# Patient Record
Sex: FEMALE | Race: WHITE | NOT HISPANIC OR LATINO | Employment: OTHER | URBAN - METROPOLITAN AREA
[De-identification: names, ages, dates, MRNs, and addresses within clinical notes are randomized per-mention and may not be internally consistent; named-entity substitution may affect disease eponyms.]

---

## 2023-05-10 LAB
ALANINE AMINOTRANSFERASE (SGPT) (U/L) IN SER/PLAS: 28 U/L (ref 7–45)
ALBUMIN (G/DL) IN SER/PLAS: 4 G/DL (ref 3.4–5)
ALKALINE PHOSPHATASE (U/L) IN SER/PLAS: 51 U/L (ref 33–136)
ANION GAP IN SER/PLAS: 12 MMOL/L (ref 10–20)
ASPARTATE AMINOTRANSFERASE (SGOT) (U/L) IN SER/PLAS: 35 U/L (ref 9–39)
BILIRUBIN TOTAL (MG/DL) IN SER/PLAS: 0.9 MG/DL (ref 0–1.2)
CALCIDIOL (25 OH VITAMIN D3) (NG/ML) IN SER/PLAS: 47 NG/ML
CALCIUM (MG/DL) IN SER/PLAS: 9.1 MG/DL (ref 8.6–10.6)
CARBON DIOXIDE, TOTAL (MMOL/L) IN SER/PLAS: 29 MMOL/L (ref 21–32)
CHLORIDE (MMOL/L) IN SER/PLAS: 104 MMOL/L (ref 98–107)
CHOLESTEROL (MG/DL) IN SER/PLAS: 257 MG/DL (ref 0–199)
CHOLESTEROL IN HDL (MG/DL) IN SER/PLAS: 114.4 MG/DL
CHOLESTEROL/HDL RATIO: 2.2
COBALAMIN (VITAMIN B12) (PG/ML) IN SER/PLAS: 588 PG/ML (ref 211–911)
CREATININE (MG/DL) IN SER/PLAS: 0.58 MG/DL (ref 0.5–1.05)
ERYTHROCYTE DISTRIBUTION WIDTH (RATIO) BY AUTOMATED COUNT: 12 % (ref 11.5–14.5)
ERYTHROCYTE MEAN CORPUSCULAR HEMOGLOBIN CONCENTRATION (G/DL) BY AUTOMATED: 32 G/DL (ref 32–36)
ERYTHROCYTE MEAN CORPUSCULAR VOLUME (FL) BY AUTOMATED COUNT: 103 FL (ref 80–100)
ERYTHROCYTES (10*6/UL) IN BLOOD BY AUTOMATED COUNT: 4.17 X10E12/L (ref 4–5.2)
GFR FEMALE: >90 ML/MIN/1.73M2
GLUCOSE (MG/DL) IN SER/PLAS: 89 MG/DL (ref 74–99)
HEMATOCRIT (%) IN BLOOD BY AUTOMATED COUNT: 42.8 % (ref 36–46)
HEMOGLOBIN (G/DL) IN BLOOD: 13.7 G/DL (ref 12–16)
LDL: 132 MG/DL (ref 0–99)
LEUKOCYTES (10*3/UL) IN BLOOD BY AUTOMATED COUNT: 3.3 X10E9/L (ref 4.4–11.3)
NRBC (PER 100 WBCS) BY AUTOMATED COUNT: 0 /100 WBC (ref 0–0)
PLATELETS (10*3/UL) IN BLOOD AUTOMATED COUNT: 201 X10E9/L (ref 150–450)
POTASSIUM (MMOL/L) IN SER/PLAS: 4 MMOL/L (ref 3.5–5.3)
PROTEIN TOTAL: 6.2 G/DL (ref 6.4–8.2)
SODIUM (MMOL/L) IN SER/PLAS: 141 MMOL/L (ref 136–145)
THYROTROPIN (MIU/L) IN SER/PLAS BY DETECTION LIMIT <= 0.05 MIU/L: 4.4 MIU/L (ref 0.44–3.98)
THYROXINE (T4) FREE (NG/DL) IN SER/PLAS: 1.04 NG/DL (ref 0.78–1.48)
TRIGLYCERIDE (MG/DL) IN SER/PLAS: 52 MG/DL (ref 0–149)
UREA NITROGEN (MG/DL) IN SER/PLAS: 15 MG/DL (ref 6–23)
VLDL: 10 MG/DL (ref 0–40)

## 2023-05-12 ENCOUNTER — OFFICE VISIT (OUTPATIENT)
Dept: PRIMARY CARE | Facility: CLINIC | Age: 73
End: 2023-05-12
Payer: MEDICARE

## 2023-05-12 VITALS
HEART RATE: 76 BPM | DIASTOLIC BLOOD PRESSURE: 80 MMHG | SYSTOLIC BLOOD PRESSURE: 161 MMHG | HEIGHT: 63 IN | WEIGHT: 97 LBS | BODY MASS INDEX: 17.19 KG/M2 | OXYGEN SATURATION: 98 %

## 2023-05-12 DIAGNOSIS — Z00.00 ROUTINE GENERAL MEDICAL EXAMINATION AT HEALTH CARE FACILITY: Primary | ICD-10-CM

## 2023-05-12 DIAGNOSIS — Z00.00 MEDICARE ANNUAL WELLNESS VISIT, SUBSEQUENT: ICD-10-CM

## 2023-05-12 DIAGNOSIS — Z12.31 ENCOUNTER FOR SCREENING MAMMOGRAM FOR BREAST CANCER: ICD-10-CM

## 2023-05-12 DIAGNOSIS — Z78.0 ASYMPTOMATIC MENOPAUSAL STATE: ICD-10-CM

## 2023-05-12 PROBLEM — D72.819 LEUKOPENIA: Status: ACTIVE | Noted: 2023-05-12

## 2023-05-12 PROBLEM — H18.529 ABMD (ANTERIOR BASEMENT MEMBRANE DYSTROPHY): Status: ACTIVE | Noted: 2023-05-12

## 2023-05-12 PROBLEM — K86.2 PANCREATIC CYST (HHS-HCC): Status: ACTIVE | Noted: 2023-05-12

## 2023-05-12 PROBLEM — M81.0 OSTEOPOROSIS: Status: ACTIVE | Noted: 2023-05-12

## 2023-05-12 PROCEDURE — 1160F RVW MEDS BY RX/DR IN RCRD: CPT | Performed by: FAMILY MEDICINE

## 2023-05-12 PROCEDURE — 1036F TOBACCO NON-USER: CPT | Performed by: FAMILY MEDICINE

## 2023-05-12 PROCEDURE — 1159F MED LIST DOCD IN RCRD: CPT | Performed by: FAMILY MEDICINE

## 2023-05-12 PROCEDURE — 99397 PER PM REEVAL EST PAT 65+ YR: CPT | Performed by: FAMILY MEDICINE

## 2023-05-12 PROCEDURE — 3008F BODY MASS INDEX DOCD: CPT | Performed by: FAMILY MEDICINE

## 2023-05-12 PROCEDURE — G0439 PPPS, SUBSEQ VISIT: HCPCS | Performed by: FAMILY MEDICINE

## 2023-05-12 PROCEDURE — 99497 ADVNCD CARE PLAN 30 MIN: CPT | Performed by: FAMILY MEDICINE

## 2023-05-12 PROCEDURE — 1170F FXNL STATUS ASSESSED: CPT | Performed by: FAMILY MEDICINE

## 2023-05-12 RX ORDER — UBIDECARENONE 75 MG
CAPSULE ORAL
COMMUNITY

## 2023-05-12 RX ORDER — BIOTIN 1 MG
1 TABLET ORAL DAILY
COMMUNITY

## 2023-05-12 RX ORDER — CALCIUM CARBONATE/VITAMIN D3 500 MG-10
TABLET,CHEWABLE ORAL 2 TIMES DAILY
COMMUNITY

## 2023-05-12 RX ORDER — CHOLECALCIFEROL (VITAMIN D3) 25 MCG
1 TABLET ORAL DAILY
COMMUNITY

## 2023-05-12 ASSESSMENT — ENCOUNTER SYMPTOMS
DEPRESSION: 0
OCCASIONAL FEELINGS OF UNSTEADINESS: 0
LOSS OF SENSATION IN FEET: 0

## 2023-05-12 ASSESSMENT — PATIENT HEALTH QUESTIONNAIRE - PHQ9
1. LITTLE INTEREST OR PLEASURE IN DOING THINGS: NOT AT ALL
SUM OF ALL RESPONSES TO PHQ9 QUESTIONS 1 AND 2: 0
2. FEELING DOWN, DEPRESSED OR HOPELESS: NOT AT ALL

## 2023-05-12 ASSESSMENT — ACTIVITIES OF DAILY LIVING (ADL)
DOING_HOUSEWORK: INDEPENDENT
GROCERY_SHOPPING: INDEPENDENT
TAKING_MEDICATION: INDEPENDENT
DRESSING: INDEPENDENT
MANAGING_FINANCES: INDEPENDENT
BATHING: INDEPENDENT

## 2023-05-12 NOTE — PROGRESS NOTES
"Subjective   Patient ID: Claudia Patten is a 73 y.o. female who presents for an Annual Medicare Wellness visit.      HPI   The patient states that she is taking Vitamin D and Calcium supplements for her osteoporosis as prescribed. She reports that she went to get dental work done and was she was told by her dentist that She needs to not be on any osteoporosis medications as they won't let her bones heal. I spoke to the dentist but didn't get convinced to stop her Medications. Her cholesterol is stable without any medications at this time. The patient complains of weight loss and thinks that this is stress related.    Review of Systems  Constitutional: No fever or chills, No Night Sweats  Eyes: No Blurry Vision or Eye sight problems  ENT: No Nasal Discharge, Hoarseness, sore throat  Cardiovascular: no chest pain, no palpitations and no syncope.   Respiratory: no cough, no shortness of breath during exertion and no shortness of breath at rest.   Gastrointestinal: no abdominal pain, no nausea and no vomiting.   : No vaginal discharge, burning with urination, no blood in urine or stools  Skin: No Skin rashes or Lesions  Neuro: No Headache, no dizziness or Numbness or tingling  Psych: No Anxiety, depression or sleeping problems  Heme: No Easy bleeding or brusing.     Objective   /80   Pulse 76   Ht 1.6 m (5' 3\")   Wt (!) 44 kg (97 lb)   SpO2 98%   BMI 17.18 kg/m²     Physical Exam  Patient declined Chaperone  Constitutional: Alert and in no acute distress. Well developed, well nourished.   Head and Face: Head and face: Normal.    Eyes: Normal external exam. Pupils were equal in size, round, reactive to light (PERRL) with normal accommodation and extraocular movements intact (EOMI).   Ears, Nose, Mouth, and Throat: External inspection of ears and nose: Normal.  Hearing: Normal.  Nasal mucosa, septum, and turbinates: Normal.  Lips, teeth, and gums: Normal.  Oropharynx: Normal.   Neck: No neck mass was " observed. Supple. Thyroid not enlarged and there were no palpable thyroid nodules.   Cardiovascular: Heart rate and rhythm were normal, normal S1 and S2. Pedal pulses: Normal. No peripheral edema.   Pulmonary: No respiratory distress. Clear bilateral breath sounds.   Breast: Normal Appearance, No Masses or lumps palpated  Abdomen: Soft nontender; no abdominal mass palpated. Normal bowel sounds. No organomegaly.   Musculoskeletal: No joint swelling seen, normal movements of all extremities. Range of motion: Normal.  Muscle strength/tone: Normal.    Skin: Normal skin color and pigmentation, normal skin turgor, and no rash.   Neurologic: Deep tendon reflexes were 2+ and symmetric.   Psychiatric: Judgment and insight: Intact. Mood and affect: Normal.  Lymphatic: No cervical lymphadenopathy. Palpation of lymph nodes in axillae: Normal.  Palpation of lymph nodes in groin: Normal.    Lab Results   Component Value Date    WBC 3.3 (L) 05/10/2023    HGB 13.7 05/10/2023    HCT 42.8 05/10/2023     05/10/2023    CHOL 257 (H) 05/10/2023    TRIG 52 05/10/2023    .4 05/10/2023    ALT 28 05/10/2023    AST 35 05/10/2023     05/10/2023    K 4.0 05/10/2023     05/10/2023    CREATININE 0.58 05/10/2023    BUN 15 05/10/2023    CO2 29 05/10/2023    TSH 4.40 (H) 05/10/2023       CT chest wo IV contrast  Narrative: Interpreted By:  FRANK PERRY MD  MRN: [id number]  Patient Name: KAREN NELSON     STUDY:  CT CHEST WO CONTRAST;  5/9/2023 10:53 am     INDICATION:  1 year follow up  R91.1: Lung nodule < 6cm on CT.     COMPARISON:  04/26/2022     ACCESSION NUMBER(S):  [id number]     ORDERING CLINICIAN:  PRUDENCIO FRYE     TECHNIQUE:  Helical data acquisition of the chest was obtained  without IV  contrast material.  Images were reformatted in axial, coronal, and  sagittal planes.     FINDINGS:  LUNGS and AIRWAYS:  A 5 mm perifissural node of the right major fissure is stable on  image 173 of series 3. There is also been  stability of a 6 mm  perifissural node on image 147. has been interval stability of  additional 2-5 mm nodules, with index nodules on the right including  image numbers 167, 173 and 175, and on the left image numbers 75 and  87. Given the time interval these would be considered benign.  An additional 3 mm right lung nodule seen on image 191 probably  peripheral mucous plug     MEDIASTINUM and FLOWER, LOWER NECK AND AXILLA:  The visualized thyroid gland is within normal limits.     No evidence of thoracic lymphadenopathy by CT criteria.     HEART and VESSELS:  The thoracic aorta is of normal course and caliber without  significant atherosclerotic calcification .     Main pulmonary artery and its branches are normal in caliber.     There is focal coronary artery atherosclerotic calcification at what  appears to be the main vessel.     The cardiac chambers are not enlarged.     UPPER ABDOMEN:  Nonobstructing calculi at the left upper renal pole measuring up to 7  mm, similar to the prior exam. Additional punctate calcification at  the right upper pole.     CHEST WALL, OSSEOUS STRUCTURES AND OTHER FINDINGS:  There are no suspicious osseous lesions.     Impression: 1.  Stability of nodules as described.  2. Additional findings as above.      Assessment/Plan   Diagnoses and all orders for this visit:  Routine general medical examination at health care facility Medicare annual wellness visit, subsequent  -     DNR Comfort Measures Only  BMI less than 19,adult  -     Referral to Nutrition Services; Future  Asymptomatic menopausal state  -     XR DEXA bone density; Future  Encounter for screening mammogram for breast cancer  -     BI mammo bilateral screening tomosynthesis; Future        Dear Claudia Patten     It was my pleasure to take care of you today in the office. Below are the things we discussed today:    1. 1. Immunizations: Yearly Flu shot is recommended. Up-to-date         a: COVID: Up-to-Date         b:  Tetanus: Up-to-date         c: Shingrix: Up-to-date         d: Pneumovax: Up-to-date         e: Prevnar: Up-to-date    2. Blood Work: Reviewed   3. Seen your dentist twice a year  4. Yearly Eye exam is recommended    5. BMI: Underweight   6: Diet recommendations:   Eat Clean, Try to have as many home cooked meals as possible  Avoid processed foods which contain excess calories, sugar, and sodium.    7. Exercise recommendations:   150 minutes a week to maintain your weight     If you have to loose weight, you need a better diet and exercise plan.     8. Supplements recommended:  a - Calcium 600 mg up to twice a day to get a total of 1200 mg. Each 8 oz of milk or yogurt or 1 oz of cheese, 1 Banana, 1 serving of green Leafy vegetable has about 300 mg of Calcium, so you may subtract that amount. Calcium citrate is the only acceptable supplement to take if you take an acid suppressing medication like Prilosec; otherwise Calcium carbonate is acceptable too (It can cause Constipation).   b - Vitamin D - 2000 IU daily     9. Please get your Living will / Advance directive completed if you do not have one already. Please make sure our office has a copy of the latest one.     10. Colonoscopy: Uptodate, repeat in Nov 2024   11. Mammogram: Uptodate, ordered.  12. PAP: Not indicated   13. DEXA: Ordered for December 2023  14: Skin Check: Please see Dermatology once a year for a Skin Check.     15. Weight loss: Advised the patient that if she is not getting enough meals drink Boost or Ensure. Nutritionist referral.    16. Dental work: Advised the patient that I will reach out to her dentist about dental work and Prolia.    17. Osteoporosis: Continue calcium, vitamin D supplements and strength training exercises. Will talk to dentist to consider starting something.     Follow up in one year for a Physical. Please call the office before your Physical to see if you need blood work completed prior to your physical.     Please call me  if any questions arise from now until your next visit. I will call you after I am done seeing patients. A Doctor is always available by phone when the office is closed. Please feel free to call for help with any problem that you feel shouldn't wait until the office re-opens.     Scribe Attestation  By signing my name below, IJessi, Ines   attest that this documentation has been prepared under the direction and in the presence of Chika Nicolas MD.

## 2023-06-28 ENCOUNTER — TELEPHONE (OUTPATIENT)
Dept: CARE COORDINATION | Facility: CLINIC | Age: 73
End: 2023-06-28
Payer: MEDICARE

## 2023-06-28 NOTE — PROGRESS NOTES
Patient Experience from recent annual visit with Dr. Nicolas in May.    Q 1. Easy get appt? ANSWER:   Q 2. Wait long to get appt? ANSWER:   Q 3. Referrals Easy? ANSWER:   Q 4. Dr know result of referral? ANSWER:   Q 5. Review Rxs? ANSWER:   Q 6. Dr review labs, xrays, etc.?  ANSWER:   Q 7. Insurance cover Rx? ANSWER:   Q 8. Dr ask about Balance? ANSWER:   Q 9.  Ask about bladder control? ANSWER:   Q 10.  Ask about exercise level? ANSWER:     Other Comments:

## 2023-07-10 ENCOUNTER — TELEPHONE (OUTPATIENT)
Dept: PRIMARY CARE | Facility: CLINIC | Age: 73
End: 2023-07-10
Payer: MEDICARE

## 2023-09-15 ENCOUNTER — TELEPHONE (OUTPATIENT)
Dept: PRIMARY CARE | Facility: CLINIC | Age: 73
End: 2023-09-15
Payer: MEDICARE

## 2023-10-11 PROBLEM — E78.5 DYSLIPIDEMIA: Status: ACTIVE | Noted: 2023-10-11

## 2023-10-11 PROBLEM — L57.9 SKIN CHANGES DUE TO CHRONIC EXPOSURE TO NONIONIZING RADIATION, UNSPECIFIED: Status: ACTIVE | Noted: 2017-12-11

## 2023-10-11 PROBLEM — D18.01 HEMANGIOMA OF SKIN AND SUBCUTANEOUS TISSUE: Status: ACTIVE | Noted: 2017-12-11

## 2023-10-11 PROBLEM — L82.1 OTHER SEBORRHEIC KERATOSIS: Status: ACTIVE | Noted: 2017-12-11

## 2023-10-11 PROBLEM — H52.4 MYOPIA WITH ASTIGMATISM AND PRESBYOPIA: Status: ACTIVE | Noted: 2023-10-11

## 2023-10-11 PROBLEM — H02.88B MEIBOMIAN GLAND DYSFUNCTION (MGD) OF UPPER AND LOWER EYELID OF LEFT EYE: Status: ACTIVE | Noted: 2023-10-11

## 2023-10-11 PROBLEM — H04.123 BILATERAL DRY EYES: Status: ACTIVE | Noted: 2023-10-11

## 2023-10-11 PROBLEM — H02.88A MEIBOMIAN GLAND DYSFUNCTION (MGD) OF UPPER AND LOWER EYELID OF RIGHT EYE: Status: ACTIVE | Noted: 2023-10-11

## 2023-10-11 PROBLEM — H69.92 DYSFUNCTION OF LEFT EUSTACHIAN TUBE: Status: ACTIVE | Noted: 2023-10-11

## 2023-10-11 PROBLEM — H52.10 MYOPIA WITH ASTIGMATISM AND PRESBYOPIA: Status: ACTIVE | Noted: 2023-10-11

## 2023-10-11 PROBLEM — H35.372 EPIRETINAL MEMBRANE (ERM) OF LEFT EYE: Status: ACTIVE | Noted: 2023-10-11

## 2023-10-11 PROBLEM — H52.209 MYOPIA WITH ASTIGMATISM AND PRESBYOPIA: Status: ACTIVE | Noted: 2023-10-11

## 2023-10-11 PROBLEM — H90.3 SENSORINEURAL HEARING LOSS, BILATERAL: Status: ACTIVE | Noted: 2023-10-11

## 2023-10-11 PROBLEM — H25.813 COMBINED FORM OF AGE-RELATED CATARACT, BOTH EYES: Status: ACTIVE | Noted: 2023-10-11

## 2023-10-11 PROBLEM — H40.059 ELEVATED IOP: Status: ACTIVE | Noted: 2023-10-11

## 2023-10-11 PROBLEM — H25.013 CORTICAL SENILE CATARACT OF BOTH EYES: Status: ACTIVE | Noted: 2023-10-11

## 2023-10-11 PROBLEM — Z96.1 PSEUDOPHAKIA OF LEFT EYE: Status: ACTIVE | Noted: 2023-10-11

## 2023-10-11 PROBLEM — K83.8 DILATION OF BILIARY TRACT: Status: ACTIVE | Noted: 2023-10-11

## 2023-10-11 PROBLEM — E78.5 HYPERLIPEMIA: Status: ACTIVE | Noted: 2023-10-11

## 2023-10-11 PROBLEM — H61.23 IMPACTED CERUMEN OF BOTH EARS: Status: ACTIVE | Noted: 2023-10-11

## 2023-10-11 RX ORDER — FAMOTIDINE 20 MG/1
20 TABLET, FILM COATED ORAL
COMMUNITY
Start: 2023-09-13

## 2023-10-13 ENCOUNTER — OFFICE VISIT (OUTPATIENT)
Dept: DERMATOLOGY | Facility: CLINIC | Age: 73
End: 2023-10-13
Payer: MEDICARE

## 2023-10-13 DIAGNOSIS — L72.0 EPIDERMOID CYST: Primary | ICD-10-CM

## 2023-10-13 DIAGNOSIS — L57.8 DIFFUSE PHOTODAMAGE OF SKIN: ICD-10-CM

## 2023-10-13 DIAGNOSIS — L21.9 SEBORRHEIC DERMATITIS: ICD-10-CM

## 2023-10-13 DIAGNOSIS — D18.00 HEMANGIOMA, UNSPECIFIED SITE: ICD-10-CM

## 2023-10-13 DIAGNOSIS — D22.60 MELANOCYTIC NEVUS OF UPPER EXTREMITY, UNSPECIFIED LATERALITY: ICD-10-CM

## 2023-10-13 PROCEDURE — 1036F TOBACCO NON-USER: CPT | Performed by: DERMATOLOGY

## 2023-10-13 PROCEDURE — 1126F AMNT PAIN NOTED NONE PRSNT: CPT | Performed by: DERMATOLOGY

## 2023-10-13 PROCEDURE — 1159F MED LIST DOCD IN RCRD: CPT | Performed by: DERMATOLOGY

## 2023-10-13 PROCEDURE — 99204 OFFICE O/P NEW MOD 45 MIN: CPT | Performed by: DERMATOLOGY

## 2023-10-13 PROCEDURE — 1160F RVW MEDS BY RX/DR IN RCRD: CPT | Performed by: DERMATOLOGY

## 2023-10-13 PROCEDURE — 3008F BODY MASS INDEX DOCD: CPT | Performed by: DERMATOLOGY

## 2023-10-13 RX ORDER — KETOCONAZOLE 20 MG/ML
SHAMPOO, SUSPENSION TOPICAL 3 TIMES WEEKLY
Qty: 120 ML | Refills: 11 | Status: CANCELLED | OUTPATIENT
Start: 2023-10-13

## 2023-10-13 NOTE — PROGRESS NOTES
Subjective     Claudia Patten is a 73 y.o. female who presents for the following: Suspicious Skin Lesion (Left upper chest).  She states the lesion has been present for several weeks and has not changed in any way since it appeared, including in size, shape, or color, and is asymptomatic with no associated bleeding, itching, or pain.    Review of Systems:  No other skin or systemic complaints other than what is documented elsewhere in the note.    The following portions of the chart were reviewed this encounter and updated as appropriate:       Skin Cancer History  No skin cancer on file.    Specialty Problems          Dermatology Problems    Hemangioma of skin and subcutaneous tissue    Other seborrheic keratosis    Skin changes due to chronic exposure to nonionizing radiation, unspecified       Past Dermatologic / Past Relevant Medical History:    No history of atypical nevi or skin cancer    Family History:    No family history of melanoma or skin cancer    Social History:    The patient is retired from working in banking    Allergies:  Patient has no known allergies.    Current Medications / CAM's:    Current Outpatient Medications:     ascorbic acid, vitamin C, 100 mg chewable tablet, Chew., Disp: , Rfl:     biotin 1 mg tablet, Take 1 tablet (1 mg) by mouth once daily., Disp: , Rfl:     calcium carbonate-vitamin D3 500 mg-10 mcg (400 unit) chewable tablet, Chew twice a day., Disp: , Rfl:     cholecalciferol (Vitamin D-3) 25 MCG (1000 UT) tablet, Take 1 tablet (25 mcg) by mouth once daily., Disp: , Rfl:     cyanocobalamin (Vitamin B-12) 500 mcg tablet, Take by mouth., Disp: , Rfl:     famotidine (Pepcid) 20 mg tablet, Take 1 tablet (20 mg) by mouth., Disp: , Rfl:      Objective   Well appearing patient in no apparent distress; mood and affect are within normal limits.    A full examination was performed including scalp, face, eyes, ears, nose, lips, neck, chest, axillae, abdomen, back, bilateral upper  extremities, and bilateral lower extremities. All findings within normal limits unless otherwise noted below.    Assessment/Plan   1. Epidermoid cyst (2)  Left Breast, Right Lower Back  On her left upper chest, there is a 6 mm round, flesh-colored, mobile, non-tender, subcutaneous, cystic-appearing nodule with a dilated central punctum    Epidermoid Cyst - left upper chest.  The benign nature of this cystic lesion was discussed with the patient today and reassurance provided.  No treatment is medically indicated for this lesion at this time.  We discussed the possibility of undergoing surgical excision of the cyst for definitive removal, but after discussing the risks and benefits of the procedure, the patient expressed understanding and states she is not interested in undergoing cyst excision at this time.    2. Seborrheic dermatitis  Scalp  On the patient's scalp, there are pink, scaly patches with whitish-yellowish, greasy scale    Seborrheic Dermatitis - scalp.  The potentially chronic and intermittently flaring nature of this condition and treatment options were discussed extensively with the patient today.  At this time, we recommend topical anti-fungal therapy with Ketoconazole 2% shampoo, which the patient was instructed to use 2-3 days per week, alternating with over-the-counter anti-dandruff shampoos, such as Head & Shoulders, Selsun Blue, and Neutrogena T-gel, every month.  The risks, benefits, and side effects of this medication were discussed.  The patient expressed understanding and is in agreement with this plan.    3. Melanocytic nevus of upper extremity, unspecified laterality  Scattered on the patient's face, neck, trunk, and extremities, there are very few small, round- to oval-shaped, brown-pigmented and pink-colored, symmetric, uniform-appearing macules and dome-shaped papules    Clinically benign- to slightly atypical-appearing nevi - the clinically benign- to slightly atypical-appearing nature  of the patient's nevi was discussed with the patient today.  None of the patient's nevi meet threshold for biopsy today.  We emphasized the importance of performing monthly self-skin exams using the ABCDs of monitoring moles, which were reviewed with the patient today and an informational hand-out provided.  We also emphasized the importance of sun avoidance and sun protection with daily sunscreen use.  The patient expressed understanding and is in agreement with this plan.    4. Hemangioma, unspecified site  Scattered on the patient's face, neck, trunk, and extremities, there are multiple small, round, cherry red- to purplish-colored, symmetric, uniform, vascular-appearing macules and papules    Cherry Angiomas - the benign nature of these vascular lesions was discussed with the patient today and reassurance provided.  No treatment is medically indicated for these lesions at this time.    5. Diffuse photodamage of skin  Diffuse photodamage with actinic changes with telangiectasia and mottled pigmentation in sun-exposed areas.    Photodamage.  The signs and symptoms of skin cancer were reviewed and the patient was advised to practice sun protection and sun avoidance, use daily sunscreen, and perform regular self skin exams.  Sun protection was discussed, including avoiding the mid-day sun, wearing a sunscreen with SPF at least 50, and stressing the need for reapplication of sunscreen and applying more than they think they need.

## 2023-11-02 ENCOUNTER — OFFICE VISIT (OUTPATIENT)
Dept: OPHTHALMOLOGY | Facility: CLINIC | Age: 73
End: 2023-11-02
Payer: MEDICARE

## 2023-11-02 DIAGNOSIS — H35.352 CYSTOID MACULAR EDEMA OF LEFT EYE: Primary | ICD-10-CM

## 2023-11-02 DIAGNOSIS — H01.006 BLEPHARITIS OF BOTH EYES, UNSPECIFIED EYELID, UNSPECIFIED TYPE: ICD-10-CM

## 2023-11-02 DIAGNOSIS — Z96.1 PSEUDOPHAKIA OF BOTH EYES: ICD-10-CM

## 2023-11-02 DIAGNOSIS — H01.003 BLEPHARITIS OF BOTH EYES, UNSPECIFIED EYELID, UNSPECIFIED TYPE: ICD-10-CM

## 2023-11-02 PROCEDURE — TAXCU SALES TAX CUYAHOGA COUNTY: Performed by: OPHTHALMOLOGY

## 2023-11-02 PROCEDURE — 92134 CPTRZ OPH DX IMG PST SGM RTA: CPT | Mod: BILATERAL PROCEDURE | Performed by: OPHTHALMOLOGY

## 2023-11-02 PROCEDURE — 92014 COMPRE OPH EXAM EST PT 1/>: CPT | Performed by: OPHTHALMOLOGY

## 2023-11-02 PROCEDURE — 99070(U11): Performed by: OPHTHALMOLOGY

## 2023-11-02 ASSESSMENT — CONF VISUAL FIELD
OS_INFERIOR_NASAL_RESTRICTION: 0
OS_SUPERIOR_TEMPORAL_RESTRICTION: 0
OD_SUPERIOR_NASAL_RESTRICTION: 0
OD_SUPERIOR_TEMPORAL_RESTRICTION: 0
OD_INFERIOR_TEMPORAL_RESTRICTION: 0
OS_INFERIOR_TEMPORAL_RESTRICTION: 0
OD_NORMAL: 1
OD_INFERIOR_NASAL_RESTRICTION: 0
OS_SUPERIOR_NASAL_RESTRICTION: 0
OS_NORMAL: 1

## 2023-11-02 ASSESSMENT — EXTERNAL EXAM - LEFT EYE: OS_EXAM: NORMAL

## 2023-11-02 ASSESSMENT — REFRACTION_MANIFEST
OS_CYLINDER: -1.25
OD_AXIS: 095
OD_CYLINDER: -0.75
OD_SPHERE: +0.25
OS_SPHERE: +0.25
OS_AXIS: 110

## 2023-11-02 ASSESSMENT — ENCOUNTER SYMPTOMS
HEMATOLOGIC/LYMPHATIC NEGATIVE: 0
MUSCULOSKELETAL NEGATIVE: 0
EYES NEGATIVE: 0
GASTROINTESTINAL NEGATIVE: 0
CARDIOVASCULAR NEGATIVE: 0
RESPIRATORY NEGATIVE: 0
NEUROLOGICAL NEGATIVE: 0
CONSTITUTIONAL NEGATIVE: 0
ENDOCRINE NEGATIVE: 0
PSYCHIATRIC NEGATIVE: 0
ALLERGIC/IMMUNOLOGIC NEGATIVE: 0

## 2023-11-02 ASSESSMENT — SLIT LAMP EXAM - LIDS
COMMENTS: NORMAL
COMMENTS: NORMAL

## 2023-11-02 ASSESSMENT — VISUAL ACUITY
CORRECTION_TYPE: GLASSES
OD_CC: 20/20
METHOD: SNELLEN - LINEAR
OS_CC: 20/20

## 2023-11-02 ASSESSMENT — TONOMETRY
IOP_METHOD: GOLDMANN APPLANATION
OD_IOP_MMHG: 17
OS_IOP_MMHG: 17

## 2023-11-02 ASSESSMENT — EXTERNAL EXAM - RIGHT EYE: OD_EXAM: NORMAL

## 2023-11-02 NOTE — PROGRESS NOTES
Assessment/Plan   Diagnoses and all orders for this visit:  Cystoid macular edema of left eye  -     OCT, Retina - OU - Both Eyes  -     OCT, Optic Nerve - OU - Both Eyes  Foveal contour did not return to normal but central thickness is dereaased compared to last test and vision is good  Retinal eval in 6 mo  Pseudophakia of both eyes  stable  Blepharitis of both eyes, unspecified eyelid, unspecified type  Cont lid wipes

## 2023-11-06 ENCOUNTER — OFFICE VISIT (OUTPATIENT)
Dept: OTOLARYNGOLOGY | Facility: CLINIC | Age: 73
End: 2023-11-06
Payer: MEDICARE

## 2023-11-06 VITALS — TEMPERATURE: 96.2 F | BODY MASS INDEX: 16.83 KG/M2 | HEIGHT: 63 IN | WEIGHT: 95 LBS

## 2023-11-06 DIAGNOSIS — H90.3 SENSORINEURAL HEARING LOSS (SNHL) OF BOTH EARS: ICD-10-CM

## 2023-11-06 DIAGNOSIS — H61.23 BILATERAL IMPACTED CERUMEN: Primary | ICD-10-CM

## 2023-11-06 PROCEDURE — 1126F AMNT PAIN NOTED NONE PRSNT: CPT | Performed by: NURSE PRACTITIONER

## 2023-11-06 PROCEDURE — 3008F BODY MASS INDEX DOCD: CPT | Performed by: NURSE PRACTITIONER

## 2023-11-06 PROCEDURE — 1159F MED LIST DOCD IN RCRD: CPT | Performed by: NURSE PRACTITIONER

## 2023-11-06 PROCEDURE — 1036F TOBACCO NON-USER: CPT | Performed by: NURSE PRACTITIONER

## 2023-11-06 PROCEDURE — 69210 REMOVE IMPACTED EAR WAX UNI: CPT | Performed by: NURSE PRACTITIONER

## 2023-11-06 PROCEDURE — 99213 OFFICE O/P EST LOW 20 MIN: CPT | Performed by: NURSE PRACTITIONER

## 2023-11-06 PROCEDURE — 1160F RVW MEDS BY RX/DR IN RCRD: CPT | Performed by: NURSE PRACTITIONER

## 2023-11-06 ASSESSMENT — PATIENT HEALTH QUESTIONNAIRE - PHQ9
SUM OF ALL RESPONSES TO PHQ9 QUESTIONS 1 AND 2: 0
2. FEELING DOWN, DEPRESSED OR HOPELESS: NOT AT ALL
1. LITTLE INTEREST OR PLEASURE IN DOING THINGS: NOT AT ALL

## 2023-11-06 NOTE — PROGRESS NOTES
Subjective   Patient ID: Claudia Patten is a 73 y.o. female who presents for Cerumen Impaction.  HPI  Patient has history of cerumen impactions and bilateral sensorineural hearing loss.  She is currently doing a trial of new hearing aids and feels that she hears better with them than her old pair.  She denies any otalgia, otorrhea.    Review of Systems  A comprehensive or 10 points review of the patient´s constitutional, neurological, HEENT, pulmonary, cardiovascular and genito-urinary systems showed only those mentioned in history of present illness.  Objective   Physical Exam  Constitutional: no fever, chills, weight loss or weight gain   General appearance: Appears well, well-nourished, well groomed. No acute distress.   Communication: Normal communication   Psychiatric: Oriented to person, place and time. Normal mood and affect.   Neurologic: Cranial nerves II-XII grossly intact and symmetric bilaterally.   Head and Face:   Head: Atraumatic with no masses, lesions or scarring.   Face: Normal symmetry, no paralysis, synkinesis or facial tic. No scars or deformities.     Eyes: Conjunctiva not edematous or erythematous   Ears: External inspection of ears with no deformity, scars or masses.  Bilateral canals with cerumen impactions.     Neck: Normal appearing, symmetric, trachea midline.   Cardiovascular: Examination of peripheral vascular system shows no clubbing or cyanosis.   Respiratory: No respiratory distress increased work of breathing. Inspection of the chest with symmetric chest expansion and normal respiratory effort.   Skin: No rashes in the head or neck    Assessment/Plan     This patient presents for subsequent evaluation of acute acquired bilateral cerumen impaction as well as chronic bilateral sensorineural hearing loss.    Reassurance given that otologic exam is normal after cleaning.  She may follow-up as needed.  All questions were answered to patient's satisfaction.  This note was created  using speech recognition transcription software. Despite proofreading, several typographical errors might be present that might affect the meaning of the content. Please call with any questions.    Patient ID: Claudia Patten is a 73 y.o. female.    Ear cerumen removal    Date/Time: 11/6/2023 1:38 PM    Performed by: JOBY López  Authorized by: JOBY López    Consent:     Consent obtained:  Verbal    Consent given by:  Patient    Risks discussed:  Pain    Alternatives discussed:  No treatment  Procedure details:     Location:  L ear and R ear    Procedure type: curette      Procedure outcomes: cerumen removed    Post-procedure details:     Inspection:  No bleeding, ear canal clear and TM intact    Hearing quality:  Normal    Procedure completion:  Tolerated well, no immediate complications

## 2023-12-13 ENCOUNTER — NUTRITION (OUTPATIENT)
Dept: PRIMARY CARE | Facility: CLINIC | Age: 73
End: 2023-12-13
Payer: MEDICARE

## 2023-12-13 VITALS — BODY MASS INDEX: 16.97 KG/M2 | WEIGHT: 95.8 LBS

## 2023-12-13 DIAGNOSIS — Z71.3 ENCOUNTER FOR DIETARY COUNSELING AND SURVEILLANCE: Primary | ICD-10-CM

## 2023-12-13 DIAGNOSIS — M81.0 OSTEOPOROSIS, UNSPECIFIED OSTEOPOROSIS TYPE, UNSPECIFIED PATHOLOGICAL FRACTURE PRESENCE: ICD-10-CM

## 2023-12-13 PROCEDURE — 97802 MEDICAL NUTRITION INDIV IN: CPT

## 2023-12-13 NOTE — PROGRESS NOTES
Nutrition: Initial Assessment    Reason for Nutrition Visit: Patient is a 73 y.o. female referred for BMI less than 19, adult. Referred on 9/15/23 by Dr. Chika Nicolas.     Subjective: Pt presents in office for nutrition counseling. Concerned over lack of wt gain despite trying to increase her kcals. She took care of her aging mother in [place] before she passed in 2018. . Was also traveling back and forth to [facility] to see her sister. Prior to COVID, would go to [facility] and [facility]. Now she does not have as many social events. Reports a high stress level as she is taking care of a couple cats that she adopted. Family members have mentioned their concern over her levels of anxiety and have suggested she get evaluated for OCD.     Food and Nutrition History: Normally eats 2 meals per day but has tried to increase to 3 meals per day.      Allergies: None  Intolerance: None  Appetite: Poor  GI Symptoms : 2-3 looser BM per day  Swallowing Difficulty: No problems with swallowing  Dentition : own  Food Preparation: Patient  Cooking Skills/Barriers: None reported  Grocery Shopping: Patient  Supplements: Vitamin B12, vitamin D3, calcium, MVI   Food Insecurity: Denies    24 Hour Diet Recall  Meal 1: omelet with vegetables, whole grain bread w/ butter, fresh fruit   Meal 2 @ 6:30-7:00 PM: [employer] meatloaf with mashed potatoes, lima beans with white sauce, zucchini  Snack: apple, cookie    Snacks: yogurt, nuts, occ protein drink   Beverages: water, decaf black coffee (no more than 24 oz), herbal tea, occ 100% juice    Physical Activity: Walking 3x per week     Labs  CMP = normal (5/10/23)   Lipid panel: high T-chol = 257, high LDL = 132 (5/10/23) -- could be related to malnutrition   Vitamin D = normal (5/10/23)     Nutrition Focused Physical Exam:  Performed/Deferred: Performed    Muscle Wasting:  Temporal: Moderate  Shoulder: Moderate  Moderate  Interosseous Muscle: None  Quadriceps: Moderate    Loss of  "Subcutaneous Fat:  Eyes: Mild  Perioral: Moderate  Triceps: Mild  Chest: Mild    Other Physical Findings:  Hair: None  None  Mouth: None  Skin: None  Nails: None  none    Malnutrition Present: Moderate Malnutrition    Past Medical History  Patient Active Problem List   Diagnosis    ABMD (anterior basement membrane dystrophy)    Leukopenia    Osteoporosis    Pancreatic cyst    Medicare annual wellness visit, subsequent    BMI less than 19,adult    Asymptomatic menopausal state    Encounter for screening mammogram for breast cancer    Routine general medical examination at health care facility    Bilateral dry eyes    Combined form of age-related cataract, both eyes    Cortical senile cataract of both eyes    Dilation of biliary tract    Dysfunction of left eustachian tube    Dyslipidemia    Elevated IOP    Epiretinal membrane (ERM) of left eye    Hemangioma of skin and subcutaneous tissue    Hyperlipemia    Impacted cerumen of both ears    Meibomian gland dysfunction (MGD) of upper and lower eyelid of left eye    Meibomian gland dysfunction (MGD) of upper and lower eyelid of right eye    Myopia with astigmatism and presbyopia    Other seborrheic keratosis    Pseudophakia of left eye    Sensorineural hearing loss, bilateral    Skin changes due to chronic exposure to nonionizing radiation, unspecified      Anthropometrics  Ht Readings from Last 1 Encounters:   11/06/23 1.6 m (5' 3\")     BMI Readings from Last 1 Encounters:   11/06/23 16.83 kg/m²     Wt Readings from Last 10 Encounters:   11/06/23 (!) 43.1 kg (95 lb)   05/12/23 (!) 44 kg (97 lb)   03/13/23 45.1 kg (99 lb 6.4 oz)   02/28/23 (!) 43.5 kg (96 lb)   12/20/22 44 kg (97 lb)   09/27/22 43.5 kg (96 lb)   09/12/22 42.6 kg (94 lb)   06/09/22 42.8 kg (94 lb 7 oz)   05/11/22 42.2 kg (93 lb)   03/22/22 43.5 kg (96 lb)     Reports her highest adult weight ~118 lbs  UBW = 115 lbs     Estimated Nutrition Needs  Estimated Energy Needs for Weight Maintenance: 1300 " kcals/day  Method for Calculating:  x 1.4    Estimated Energy Needs for Weight Gain: 1550 kcals/day  Method for Calculating: Maintenance + 250    Estimated Protein Needs: 65 grams/day  Method for Calculatin.5 g/kg/d    Nutrition Diagnosis:    Diagnosis Statement 1:  Diagnosis Status: New  Diagnosis : Malnutrition; moderate starvation related related to  inadequate energy intake  as evidenced by  estimated energy intake < 75% estimated energy needs for > 3 months, moderate muscle wasting in temporal and shoulder regions, and mild to moderate loss of fat in the perioral and eye regions.     Nutrition Interventions:  FOOD & NUTRIENT DELIVERY:   Increased energy diet (~1500 kcals per day)  Add protein supplement for an additional 150 kcals/day     COORDINATION OF CARE:   Recommended talking to Dr. Nicolas about high levels of anxiety and your concerns related to possible OCD as this could be impacting your appetite.     NUTRITION EDUCATION:   Discussed seeking out more social events, especially events that involve food ([facility], [facility]).   Discussed adding [employer]life Protein shake for additional calories. Recommend consuming shake between meals and not with meals to maximize intake during meals.   Continue to aim for 3 meals per day.   Use more high calorie additions to meals that do not necessarily add a large volume to the meal, such as peanut butter, olive oil, nuts, etc.   Add some light strength activities 3x per week to increase appetite and preserve bone health and muscle mass.   *Patient expressed understanding of the education provided and denied any additional questions/concerns.     Educational Handouts: [employer] High Calorie Recipe Handouts    Nutrition Goals:  PO intake > 75% estimated energy needs  Gradual weight gain   Initiate exercise regimen     Readiness to Change : Excellent  Level of Understanding : Excellent  Anticipated Compliant : Excellent      Follow-up: 2 months

## 2024-01-03 DIAGNOSIS — E55.9 VITAMIN D DEFICIENCY: ICD-10-CM

## 2024-01-03 DIAGNOSIS — E78.5 DYSLIPIDEMIA: ICD-10-CM

## 2024-01-03 DIAGNOSIS — R73.9 ELEVATED BLOOD SUGAR: ICD-10-CM

## 2024-01-03 DIAGNOSIS — D72.819 LEUKOPENIA, UNSPECIFIED TYPE: Primary | ICD-10-CM

## 2024-01-03 DIAGNOSIS — R94.6 ABNORMAL THYROID EXAM: ICD-10-CM

## 2024-01-29 ENCOUNTER — APPOINTMENT (OUTPATIENT)
Dept: PRIMARY CARE | Facility: CLINIC | Age: 74
End: 2024-01-29
Payer: MEDICARE

## 2024-02-08 ENCOUNTER — OFFICE VISIT (OUTPATIENT)
Dept: OTOLARYNGOLOGY | Facility: CLINIC | Age: 74
End: 2024-02-08
Payer: MEDICARE

## 2024-02-08 VITALS — HEIGHT: 63 IN | TEMPERATURE: 97.3 F | BODY MASS INDEX: 17.5 KG/M2 | WEIGHT: 98.8 LBS

## 2024-02-08 DIAGNOSIS — H90.3 SENSORINEURAL HEARING LOSS (SNHL) OF BOTH EARS: ICD-10-CM

## 2024-02-08 DIAGNOSIS — H61.23 BILATERAL IMPACTED CERUMEN: Primary | ICD-10-CM

## 2024-02-08 PROCEDURE — 1036F TOBACCO NON-USER: CPT | Performed by: NURSE PRACTITIONER

## 2024-02-08 PROCEDURE — 1157F ADVNC CARE PLAN IN RCRD: CPT | Performed by: NURSE PRACTITIONER

## 2024-02-08 PROCEDURE — 3008F BODY MASS INDEX DOCD: CPT | Performed by: NURSE PRACTITIONER

## 2024-02-08 PROCEDURE — 99213 OFFICE O/P EST LOW 20 MIN: CPT | Performed by: NURSE PRACTITIONER

## 2024-02-08 PROCEDURE — 1126F AMNT PAIN NOTED NONE PRSNT: CPT | Performed by: NURSE PRACTITIONER

## 2024-02-08 PROCEDURE — 69210 REMOVE IMPACTED EAR WAX UNI: CPT | Performed by: NURSE PRACTITIONER

## 2024-02-08 PROCEDURE — 1159F MED LIST DOCD IN RCRD: CPT | Performed by: NURSE PRACTITIONER

## 2024-02-08 PROCEDURE — 1160F RVW MEDS BY RX/DR IN RCRD: CPT | Performed by: NURSE PRACTITIONER

## 2024-02-08 ASSESSMENT — PATIENT HEALTH QUESTIONNAIRE - PHQ9
SUM OF ALL RESPONSES TO PHQ9 QUESTIONS 1 AND 2: 0
1. LITTLE INTEREST OR PLEASURE IN DOING THINGS: NOT AT ALL
2. FEELING DOWN, DEPRESSED OR HOPELESS: NOT AT ALL

## 2024-02-08 NOTE — PROGRESS NOTES
Subjective   Patient ID: Claudia Patten is a 73 y.o. female who presents for Cerumen Impaction.  HPI  Patient has history of cerumen impactions and bilateral sensorineural hearing loss.  She wears bilateral hearing aids with good benefit.  She denies any otalgia, otorrhea.    Review of Systems  A comprehensive or 10 points review of the patient´s constitutional, neurological, HEENT, pulmonary, cardiovascular and genito-urinary systems showed only those mentioned in history of present illness.  Objective   Physical Exam  Constitutional: no fever, chills, weight loss or weight gain   General appearance: Appears well, well-nourished, well groomed. No acute distress.   Communication: Normal communication   Psychiatric: Oriented to person, place and time. Normal mood and affect.   Neurologic: Cranial nerves II-XII grossly intact and symmetric bilaterally.   Head and Face:   Head: Atraumatic with no masses, lesions or scarring.   Face: Normal symmetry, no paralysis, synkinesis or facial tic. No scars or deformities.     Eyes: Conjunctiva not edematous or erythematous   Ears: External inspection of ears with no deformity, scars or masses.  Bilateral canals with cerumen impactions.     Neck: Normal appearing, symmetric, trachea midline.   Cardiovascular: Examination of peripheral vascular system shows no clubbing or cyanosis.   Respiratory: No respiratory distress increased work of breathing. Inspection of the chest with symmetric chest expansion and normal respiratory effort.   Skin: No rashes in the head or neck    Assessment/Plan     This patient presents for subsequent evaluation of acute acquired bilateral cerumen impaction as well as chronic bilateral sensorineural hearing loss.    Reassurance given that otologic exam is normal after cleaning.  She may follow-up as needed.  All questions were answered to patient's satisfaction.  This note was created using speech recognition transcription software. Despite  proofreading, several typographical errors might be present that might affect the meaning of the content. Please call with any questions.    Patient ID: Claudia Patten is a 73 y.o. female.    Ear cerumen removal    Date/Time: 2/8/2024 2:54 PM    Performed by: JOBY López  Authorized by: JOBY López    Consent:     Consent obtained:  Verbal    Consent given by:  Patient    Risks discussed:  Pain    Alternatives discussed:  No treatment  Procedure details:     Location:  L ear and R ear    Procedure type: curette      Procedure outcomes: cerumen removed    Post-procedure details:     Inspection:  No bleeding, ear canal clear and TM intact    Hearing quality:  Normal    Procedure completion:  Tolerated well, no immediate complications

## 2024-03-21 ENCOUNTER — TELEPHONE (OUTPATIENT)
Dept: PRIMARY CARE | Facility: CLINIC | Age: 74
End: 2024-03-21
Payer: MEDICARE

## 2024-04-25 ENCOUNTER — LAB (OUTPATIENT)
Dept: LAB | Facility: LAB | Age: 74
End: 2024-04-25
Payer: MEDICARE

## 2024-04-25 DIAGNOSIS — E55.9 VITAMIN D DEFICIENCY: ICD-10-CM

## 2024-04-25 DIAGNOSIS — D72.819 LEUKOPENIA, UNSPECIFIED TYPE: ICD-10-CM

## 2024-04-25 DIAGNOSIS — E78.5 DYSLIPIDEMIA: ICD-10-CM

## 2024-04-25 DIAGNOSIS — R73.9 ELEVATED BLOOD SUGAR: ICD-10-CM

## 2024-04-25 DIAGNOSIS — R94.6 ABNORMAL THYROID EXAM: ICD-10-CM

## 2024-04-25 LAB
25(OH)D3 SERPL-MCNC: 41 NG/ML (ref 30–100)
ALBUMIN SERPL BCP-MCNC: 3.9 G/DL (ref 3.4–5)
ALP SERPL-CCNC: 58 U/L (ref 33–136)
ALT SERPL W P-5'-P-CCNC: 24 U/L (ref 7–45)
ANION GAP SERPL CALC-SCNC: 10 MMOL/L (ref 10–20)
AST SERPL W P-5'-P-CCNC: 33 U/L (ref 9–39)
BILIRUB SERPL-MCNC: 0.8 MG/DL (ref 0–1.2)
BUN SERPL-MCNC: 20 MG/DL (ref 6–23)
CALCIUM SERPL-MCNC: 8.8 MG/DL (ref 8.6–10.6)
CHLORIDE SERPL-SCNC: 102 MMOL/L (ref 98–107)
CHOLEST SERPL-MCNC: 258 MG/DL (ref 0–199)
CHOLESTEROL/HDL RATIO: 2.5
CO2 SERPL-SCNC: 31 MMOL/L (ref 21–32)
CREAT SERPL-MCNC: 0.58 MG/DL (ref 0.5–1.05)
EGFRCR SERPLBLD CKD-EPI 2021: >90 ML/MIN/1.73M*2
ERYTHROCYTE [DISTWIDTH] IN BLOOD BY AUTOMATED COUNT: 12.1 % (ref 11.5–14.5)
EST. AVERAGE GLUCOSE BLD GHB EST-MCNC: 108 MG/DL
GLUCOSE SERPL-MCNC: 77 MG/DL (ref 74–99)
HBA1C MFR BLD: 5.4 %
HCT VFR BLD AUTO: 43 % (ref 36–46)
HDLC SERPL-MCNC: 104 MG/DL
HGB BLD-MCNC: 13.9 G/DL (ref 12–16)
LDLC SERPL CALC-MCNC: 143 MG/DL
MCH RBC QN AUTO: 32.6 PG (ref 26–34)
MCHC RBC AUTO-ENTMCNC: 32.3 G/DL (ref 32–36)
MCV RBC AUTO: 101 FL (ref 80–100)
NON HDL CHOLESTEROL: 154 MG/DL (ref 0–149)
NRBC BLD-RTO: 0 /100 WBCS (ref 0–0)
PLATELET # BLD AUTO: 231 X10*3/UL (ref 150–450)
POTASSIUM SERPL-SCNC: 3.8 MMOL/L (ref 3.5–5.3)
PROT SERPL-MCNC: 6.1 G/DL (ref 6.4–8.2)
RBC # BLD AUTO: 4.26 X10*6/UL (ref 4–5.2)
SODIUM SERPL-SCNC: 139 MMOL/L (ref 136–145)
TRIGL SERPL-MCNC: 57 MG/DL (ref 0–149)
TSH SERPL-ACNC: 2.51 MIU/L (ref 0.44–3.98)
VIT B12 SERPL-MCNC: 401 PG/ML (ref 211–911)
VLDL: 11 MG/DL (ref 0–40)
WBC # BLD AUTO: 3.9 X10*3/UL (ref 4.4–11.3)

## 2024-04-25 PROCEDURE — 84443 ASSAY THYROID STIM HORMONE: CPT

## 2024-04-25 PROCEDURE — 80053 COMPREHEN METABOLIC PANEL: CPT

## 2024-04-25 PROCEDURE — 82607 VITAMIN B-12: CPT

## 2024-04-25 PROCEDURE — 36415 COLL VENOUS BLD VENIPUNCTURE: CPT

## 2024-04-25 PROCEDURE — 80061 LIPID PANEL: CPT

## 2024-04-25 PROCEDURE — 85027 COMPLETE CBC AUTOMATED: CPT

## 2024-04-25 PROCEDURE — 82306 VITAMIN D 25 HYDROXY: CPT

## 2024-04-25 PROCEDURE — 83036 HEMOGLOBIN GLYCOSYLATED A1C: CPT

## 2024-05-09 ENCOUNTER — OFFICE VISIT (OUTPATIENT)
Dept: OTOLARYNGOLOGY | Facility: CLINIC | Age: 74
End: 2024-05-09
Payer: MEDICARE

## 2024-05-09 VITALS — HEIGHT: 63 IN | BODY MASS INDEX: 17.4 KG/M2 | WEIGHT: 98.2 LBS | TEMPERATURE: 96.8 F

## 2024-05-09 DIAGNOSIS — H90.3 SENSORINEURAL HEARING LOSS (SNHL) OF BOTH EARS: ICD-10-CM

## 2024-05-09 DIAGNOSIS — H61.23 BILATERAL IMPACTED CERUMEN: Primary | ICD-10-CM

## 2024-05-09 PROCEDURE — 1036F TOBACCO NON-USER: CPT | Performed by: NURSE PRACTITIONER

## 2024-05-09 PROCEDURE — 1157F ADVNC CARE PLAN IN RCRD: CPT | Performed by: NURSE PRACTITIONER

## 2024-05-09 PROCEDURE — 69210 REMOVE IMPACTED EAR WAX UNI: CPT | Performed by: NURSE PRACTITIONER

## 2024-05-09 PROCEDURE — 1159F MED LIST DOCD IN RCRD: CPT | Performed by: NURSE PRACTITIONER

## 2024-05-09 PROCEDURE — 99213 OFFICE O/P EST LOW 20 MIN: CPT | Performed by: NURSE PRACTITIONER

## 2024-05-09 PROCEDURE — 1160F RVW MEDS BY RX/DR IN RCRD: CPT | Performed by: NURSE PRACTITIONER

## 2024-05-09 ASSESSMENT — PATIENT HEALTH QUESTIONNAIRE - PHQ9
2. FEELING DOWN, DEPRESSED OR HOPELESS: NOT AT ALL
1. LITTLE INTEREST OR PLEASURE IN DOING THINGS: NOT AT ALL
SUM OF ALL RESPONSES TO PHQ9 QUESTIONS 1 AND 2: 0

## 2024-05-09 NOTE — PROGRESS NOTES
Subjective   Patient ID: Claudia Patten is a 74 y.o. female who presents for Cerumen Impaction.  HPI  Patient has history of cerumen impactions and bilateral sensorineural hearing loss.  She wears bilateral hearing aids with good benefit.  She denies any otalgia, otorrhea.    Review of Systems  A comprehensive or 10 points review of the patient´s constitutional, neurological, HEENT, pulmonary, cardiovascular and genito-urinary systems showed only those mentioned in history of present illness.  Objective   Physical Exam  Constitutional: no fever, chills, weight loss or weight gain   General appearance: Appears well, well-nourished, well groomed. No acute distress.   Communication: Normal communication   Psychiatric: Oriented to person, place and time. Normal mood and affect.   Neurologic: Cranial nerves II-XII grossly intact and symmetric bilaterally.   Head and Face:   Head: Atraumatic with no masses, lesions or scarring.   Face: Normal symmetry, no paralysis, synkinesis or facial tic. No scars or deformities.     Eyes: Conjunctiva not edematous or erythematous   Ears: External inspection of ears with no deformity, scars or masses.  Bilateral canals with cerumen impactions.     Neck: Normal appearing, symmetric, trachea midline.   Cardiovascular: Examination of peripheral vascular system shows no clubbing or cyanosis.   Respiratory: No respiratory distress increased work of breathing. Inspection of the chest with symmetric chest expansion and normal respiratory effort.   Skin: No rashes in the head or neck    Assessment/Plan     This patient presents for subsequent evaluation of acute acquired bilateral cerumen impaction as well as chronic bilateral sensorineural hearing loss.    Reassurance given that otologic exam is normal after cleaning.  She may follow-up as needed.  All questions were answered to patient's satisfaction.    This note was created using speech recognition transcription software. Despite  proofreading, several typographical errors might be present that might affect the meaning of the content. Please call with any questions.    Patient ID: Claudia Patten is a 74 y.o. female.    Ear cerumen removal    Date/Time: 5/9/2024 1:41 PM    Performed by: JOBY López  Authorized by: JOBY López    Consent:     Consent obtained:  Verbal    Consent given by:  Patient    Risks discussed:  Pain    Alternatives discussed:  No treatment  Procedure details:     Location:  L ear and R ear    Procedure type: curette      Procedure type comment:  Suction and alligator    Procedure outcomes: cerumen removed    Post-procedure details:     Inspection:  No bleeding, ear canal clear and TM intact    Hearing quality:  Normal    Procedure completion:  Tolerated well, no immediate complications

## 2024-05-13 NOTE — PROGRESS NOTES
"Subjective   Patient ID: Claudia Patten is a 74 y.o. female who presents for Medicare Annual Wellness Visit Subsequent and OCD (Obsessive-Compulsive Disorder) (Concerns).      HPI   The patient reports that she is taking Pepcid for GERD and denies having side effects from it. She states that her family members are concerned about her having OCD however, she does not think this is the case. She thinks that it is her natural personality and that there may be a component of anxiety involved.    Review of Systems  Constitutional: No fever or chills, No Night Sweats  Eyes: No Blurry Vision or Eye sight problems  ENT: No Nasal Discharge, Hoarseness, sore throat  Cardiovascular: no chest pain, no palpitations and no syncope.   Respiratory: no cough, no shortness of breath during exertion and no shortness of breath at rest.   Gastrointestinal: no abdominal pain, no nausea and no vomiting.   : No vaginal discharge, burning with urination, no blood in urine or stools  Skin: No Skin rashes or Lesions  Neuro: No Headache, no dizziness or Numbness or tingling  Psych: No Anxiety, depression or sleeping problems  Heme: No Easy bleeding or brusing.     Objective   /83   Pulse 66   Ht 1.6 m (5' 3\")   Wt (!) 43.5 kg (96 lb)   SpO2 98%   BMI 17.01 kg/m²     Physical Exam  Constitutional: Alert and in no acute distress. Well developed, well nourished.   Head and Face: Head and face: Normal.    Eyes: Normal external exam. Pupils were equal in size, round, reactive to light (PERRL) with normal accommodation and extraocular movements intact (EOMI).   Ears, Nose, Mouth, and Throat: External inspection of ears and nose: Normal.  Hearing: Normal.  Nasal mucosa, septum, and turbinates: Normal.  Lips, teeth, and gums: Normal.  Oropharynx: Normal.   Neck: No neck mass was observed. Supple. Thyroid not enlarged and there were no palpable thyroid nodules.   Cardiovascular: Heart rate and rhythm were normal, normal S1 and S2. " Pedal pulses: Normal. No peripheral edema.   Pulmonary: No respiratory distress. Clear bilateral breath sounds.   Abdomen: Soft nontender; no abdominal mass palpated. Normal bowel sounds. No organomegaly.   Musculoskeletal: No joint swelling seen, normal movements of all extremities. Range of motion: Normal.  Muscle strength/tone: Normal.    Skin: Normal skin color and pigmentation, normal skin turgor, and no rash.   Neurologic: Deep tendon reflexes were 2+ and symmetric.   Psychiatric: Judgment and insight: Intact. Mood and affect: Normal.  Lymphatic: No cervical lymphadenopathy. Palpation of lymph nodes in axillae: Normal.  Palpation of lymph nodes in groin: Normal.    Lab Results   Component Value Date    WBC 3.9 (L) 04/25/2024    HGB 13.9 04/25/2024    HCT 43.0 04/25/2024     04/25/2024    CHOL 258 (H) 04/25/2024    TRIG 57 04/25/2024    .0 04/25/2024    ALT 24 04/25/2024    AST 33 04/25/2024     04/25/2024    K 3.8 04/25/2024     04/25/2024    CREATININE 0.58 04/25/2024    BUN 20 04/25/2024    CO2 31 04/25/2024    TSH 2.51 04/25/2024    HGBA1C 5.4 04/25/2024       OCT, Optic Nerve - OU - Both Eyes  Retinal multimodal imaging including photography was completed, and the   findings are described in the examination.  stable  OCT, Retina - OU - Both Eyes  Right Eye  Findings include normal observations.     Left Eye  Findings include abnormal foveal contour.     Notes  Retinal multimodal imaging including photography was completed, and the   findings are described in the examination.  Central thickness 11/21 445, today 416      Assessment/Plan   Diagnoses and all orders for this visit:  Medicare annual wellness visit, subsequent  Protein-calorie malnutrition, unspecified severity (Multi)  BMI less than 19,adult  Asymptomatic menopausal state  -     XR DEXA bone density; Future  Encounter for screening mammogram for breast cancer  -     BI mammo bilateral screening tomosynthesis;  Future  Encounter for screening for malignant neoplasm of colon  -     Colonoscopy Screening; High Risk Patient; Future  MARKUS (generalized anxiety disorder)  -     FLUoxetine (PROzac) 10 mg capsule; Take 1 capsule (10 mg) by mouth once daily.  -     Follow Up In [facility] - PCP - Established; Future  Moderate mixed hyperlipidemia not requiring statin therapy  -     Lipoprotein a; Future        Dear Claudia Patten     It was my pleasure to take care of you today in the office. Below are the things we discussed today:    1. 1. Immunizations: Yearly Flu shot is recommended. Up-to-date          a: COVID: Please get booster from the pharmacy          b: Tetanus: Up-to-date. Done in 2022          c: Shingrix: Up-to-date         d: Pneumovax: Up-to-date         e: Prevnar: Up-to-date         F. RSV: Please get from the pharmacy in fall     2. Blood Work: Reviewed   3. Seen your dentist twice a year  4. Yearly Eye exam is recommended    5. BMI: Underweight  6: Diet recommendations:   Eat Clean, Try to have as many home cooked meals as possible  Avoid processed foods which contain excess calories, sugar, and sodium.    7. Exercise recommendations:   150 minutes a week to maintain your weight     If you have to loose weight, you need a better diet and exercise plan.     8. Supplements recommended:  a - Calcium 600 mg up to twice a day to get a total of 1200 mg. Each 8 oz of milk or yogurt or 1 oz of cheese, 1 Banana, 1 serving of green Leafy vegetable has about 300 mg of Calcium, so you may subtract that amount. Calcium citrate is the only acceptable supplement to take if you take an acid suppressing medication like Prilosec; otherwise Calcium carbonate is acceptable too (It can cause Constipation).   b - Vitamin D - 2000 IU daily     9. Please get your Living will / Advance directive completed if you do not have one already. Please make sure our office has a copy of the latest one.     10. Colonoscopy:  Uptodate. Last done in Oct 2021, repeat in Oct 2024. Ordered for Oct 2024   11. Mammogram: Uptodate. Ordered for June 2024   12. PAP: Not indicated   13. DEXA: Ordered  14: Skin Check: Please see Dermatology once a year for a Skin Check.     15. GERD: Continue Pepcid.     16. Lung nodule: Stable.    17. Mild anxiety: Start Prozac. Advised her to stop taking the medication and let me know if she notices any significant side effects after taking it.    18. Hyperlipidemia: Lipoprotein A ordered.    19. Neutropenia: Stable.    Follow up in 6 weeks.    Follow up in one year for a Physical. Please call the office before your Physical to see if you need blood work completed prior to your physical.     Please call me if any questions arise from now until your next visit. I will call you after I am done seeing patients. A Doctor is always available by phone when the office is closed. Please feel free to call for help with any problem that you feel shouldn't wait until the office re-opens.     Scribe Attestation  By signing my name below, IJessi Scribe   attest that this documentation has been prepared under the direction and in the presence of Chika Nicolas MD.

## 2024-05-14 ENCOUNTER — LAB (OUTPATIENT)
Dept: LAB | Facility: LAB | Age: 74
End: 2024-05-14
Payer: MEDICARE

## 2024-05-14 ENCOUNTER — OFFICE VISIT (OUTPATIENT)
Dept: PRIMARY CARE | Facility: CLINIC | Age: 74
End: 2024-05-14
Payer: MEDICARE

## 2024-05-14 VITALS
BODY MASS INDEX: 17.01 KG/M2 | DIASTOLIC BLOOD PRESSURE: 83 MMHG | OXYGEN SATURATION: 98 % | HEART RATE: 66 BPM | SYSTOLIC BLOOD PRESSURE: 130 MMHG | HEIGHT: 63 IN | WEIGHT: 96 LBS

## 2024-05-14 DIAGNOSIS — E46 PROTEIN-CALORIE MALNUTRITION, UNSPECIFIED SEVERITY (MULTI): ICD-10-CM

## 2024-05-14 DIAGNOSIS — Z12.11 ENCOUNTER FOR SCREENING FOR MALIGNANT NEOPLASM OF COLON: ICD-10-CM

## 2024-05-14 DIAGNOSIS — F41.1 GAD (GENERALIZED ANXIETY DISORDER): ICD-10-CM

## 2024-05-14 DIAGNOSIS — E78.2 MODERATE MIXED HYPERLIPIDEMIA NOT REQUIRING STATIN THERAPY: ICD-10-CM

## 2024-05-14 DIAGNOSIS — Z12.31 ENCOUNTER FOR SCREENING MAMMOGRAM FOR BREAST CANCER: ICD-10-CM

## 2024-05-14 DIAGNOSIS — Z00.00 MEDICARE ANNUAL WELLNESS VISIT, SUBSEQUENT: Primary | ICD-10-CM

## 2024-05-14 DIAGNOSIS — Z78.0 ASYMPTOMATIC MENOPAUSAL STATE: ICD-10-CM

## 2024-05-14 PROBLEM — E78.5 DYSLIPIDEMIA: Status: RESOLVED | Noted: 2023-10-11 | Resolved: 2024-05-14

## 2024-05-14 PROCEDURE — G0439 PPPS, SUBSEQ VISIT: HCPCS | Performed by: FAMILY MEDICINE

## 2024-05-14 PROCEDURE — 1036F TOBACCO NON-USER: CPT | Performed by: FAMILY MEDICINE

## 2024-05-14 PROCEDURE — 99397 PER PM REEVAL EST PAT 65+ YR: CPT | Performed by: FAMILY MEDICINE

## 2024-05-14 PROCEDURE — 1159F MED LIST DOCD IN RCRD: CPT | Performed by: FAMILY MEDICINE

## 2024-05-14 PROCEDURE — 82172 ASSAY OF APOLIPOPROTEIN: CPT

## 2024-05-14 PROCEDURE — 1160F RVW MEDS BY RX/DR IN RCRD: CPT | Performed by: FAMILY MEDICINE

## 2024-05-14 PROCEDURE — 36415 COLL VENOUS BLD VENIPUNCTURE: CPT

## 2024-05-14 PROCEDURE — 1157F ADVNC CARE PLAN IN RCRD: CPT | Performed by: FAMILY MEDICINE

## 2024-05-14 PROCEDURE — 1123F ACP DISCUSS/DSCN MKR DOCD: CPT | Performed by: FAMILY MEDICINE

## 2024-05-14 PROCEDURE — 99214 OFFICE O/P EST MOD 30 MIN: CPT | Performed by: FAMILY MEDICINE

## 2024-05-14 PROCEDURE — 1170F FXNL STATUS ASSESSED: CPT | Performed by: FAMILY MEDICINE

## 2024-05-14 RX ORDER — FLUOXETINE 10 MG/1
10 CAPSULE ORAL DAILY
Qty: 90 CAPSULE | Refills: 0 | Status: SHIPPED | OUTPATIENT
Start: 2024-05-14

## 2024-05-14 ASSESSMENT — ANXIETY QUESTIONNAIRES
GAD7 TOTAL SCORE: 9
IF YOU CHECKED OFF ANY PROBLEMS ON THIS QUESTIONNAIRE, HOW DIFFICULT HAVE THESE PROBLEMS MADE IT FOR YOU TO DO YOUR WORK, TAKE CARE OF THINGS AT HOME, OR GET ALONG WITH OTHER PEOPLE: SOMEWHAT DIFFICULT
2. NOT BEING ABLE TO STOP OR CONTROL WORRYING: SEVERAL DAYS
7. FEELING AFRAID AS IF SOMETHING AWFUL MIGHT HAPPEN: NOT AT ALL
3. WORRYING TOO MUCH ABOUT DIFFERENT THINGS: MORE THAN HALF THE DAYS
5. BEING SO RESTLESS THAT IT IS HARD TO SIT STILL: NEARLY EVERY DAY
6. BECOMING EASILY ANNOYED OR IRRITABLE: SEVERAL DAYS
4. TROUBLE RELAXING: SEVERAL DAYS
1. FEELING NERVOUS, ANXIOUS, OR ON EDGE: SEVERAL DAYS

## 2024-05-14 ASSESSMENT — ACTIVITIES OF DAILY LIVING (ADL)
TAKING_MEDICATION: INDEPENDENT
BATHING: INDEPENDENT
GROCERY_SHOPPING: INDEPENDENT
MANAGING_FINANCES: INDEPENDENT
DOING_HOUSEWORK: INDEPENDENT
DRESSING: INDEPENDENT

## 2024-05-14 ASSESSMENT — ENCOUNTER SYMPTOMS
OCCASIONAL FEELINGS OF UNSTEADINESS: 0
DEPRESSION: 0
LOSS OF SENSATION IN FEET: 0

## 2024-05-14 ASSESSMENT — COLUMBIA-SUICIDE SEVERITY RATING SCALE - C-SSRS
1. IN THE PAST MONTH, HAVE YOU WISHED YOU WERE DEAD OR WISHED YOU COULD GO TO SLEEP AND NOT WAKE UP?: NO
2. HAVE YOU ACTUALLY HAD ANY THOUGHTS OF KILLING YOURSELF?: NO

## 2024-05-17 LAB — LPA SERPL-MCNC: 9 MG/DL

## 2024-05-22 ENCOUNTER — OFFICE VISIT (OUTPATIENT)
Dept: OPHTHALMOLOGY | Facility: CLINIC | Age: 74
End: 2024-05-22
Payer: MEDICARE

## 2024-05-22 DIAGNOSIS — H35.352 CYSTOID MACULAR EDEMA OF LEFT EYE: Primary | ICD-10-CM

## 2024-05-22 DIAGNOSIS — H35.372 EPIRETINAL MEMBRANE (ERM) OF LEFT EYE: ICD-10-CM

## 2024-05-22 PROCEDURE — 92134 CPTRZ OPH DX IMG PST SGM RTA: CPT

## 2024-05-22 PROCEDURE — 99213 OFFICE O/P EST LOW 20 MIN: CPT

## 2024-05-22 ASSESSMENT — REFRACTION_WEARINGRX
OS_CYLINDER: -0.75
OS_AXIS: 110
OD_ADD: +2.50
OS_ADD: +2.50
OD_SPHERE: +0.50
OS_SPHERE: +0.25
OD_AXIS: 095
OD_CYLINDER: -1.50

## 2024-05-22 ASSESSMENT — CONF VISUAL FIELD
OS_INFERIOR_NASAL_RESTRICTION: 0
OS_SUPERIOR_NASAL_RESTRICTION: 0
OD_SUPERIOR_NASAL_RESTRICTION: 0
OS_SUPERIOR_TEMPORAL_RESTRICTION: 0
OD_INFERIOR_TEMPORAL_RESTRICTION: 0
OD_SUPERIOR_TEMPORAL_RESTRICTION: 0
OS_INFERIOR_TEMPORAL_RESTRICTION: 0
OD_INFERIOR_NASAL_RESTRICTION: 0

## 2024-05-22 ASSESSMENT — ENCOUNTER SYMPTOMS
RESPIRATORY NEGATIVE: 0
CONSTITUTIONAL NEGATIVE: 0
GASTROINTESTINAL NEGATIVE: 0
NEUROLOGICAL NEGATIVE: 0
MUSCULOSKELETAL NEGATIVE: 0
ALLERGIC/IMMUNOLOGIC NEGATIVE: 0
ENDOCRINE NEGATIVE: 0
PSYCHIATRIC NEGATIVE: 0
EYES NEGATIVE: 0
CARDIOVASCULAR NEGATIVE: 0
HEMATOLOGIC/LYMPHATIC NEGATIVE: 0

## 2024-05-22 ASSESSMENT — EXTERNAL EXAM - LEFT EYE: OS_EXAM: NORMAL

## 2024-05-22 ASSESSMENT — EXTERNAL EXAM - RIGHT EYE: OD_EXAM: NORMAL

## 2024-05-22 ASSESSMENT — VISUAL ACUITY
OS_CC: 20/30
CORRECTION_TYPE: GLASSES
OD_CC+: -3
OS_CC+: -1
OD_CC: 20/20
METHOD: SNELLEN - LINEAR

## 2024-05-22 ASSESSMENT — TONOMETRY
OD_IOP_MMHG: 15
OS_IOP_MMHG: 15
IOP_METHOD: GOLDMANN APPLANATION

## 2024-05-22 ASSESSMENT — SLIT LAMP EXAM - LIDS
COMMENTS: NORMAL
COMMENTS: NORMAL

## 2024-05-22 NOTE — PROGRESS NOTES
ERM, left eye  S/P phaco vit ERM peel ([facility])    OCT 05/22/24     - Right eye (OD): spongy edema, DoNFL, intact RPE, outer and inner retinal layers. No IRF or SRF    - Left eye (OS): Normal foveal contour, intact RPE  outer and inner retinal layers. No IRF or SRF      Impression  Increased retinal thickeners s/p ERM peel, left eye. Normal finding after ERM peel  Observe  RTC in 1 year with Dr. Cordero with me PRN     Pseudophakia of both eyes  Blepharitis of both eyes, unspecified eyelid, unspecified type  Cont lid wipes

## 2024-05-31 ENCOUNTER — APPOINTMENT (OUTPATIENT)
Dept: RADIOLOGY | Facility: CLINIC | Age: 74
End: 2024-05-31
Payer: MEDICARE

## 2024-05-31 DIAGNOSIS — Z12.11 COLON CANCER SCREENING: Primary | ICD-10-CM

## 2024-05-31 RX ORDER — SOD SULF/POT CHLORIDE/MAG SULF 1.479 G
TABLET ORAL
Qty: 24 TABLET | Refills: 0 | Status: SHIPPED | OUTPATIENT
Start: 2024-05-31

## 2024-06-18 ENCOUNTER — APPOINTMENT (OUTPATIENT)
Dept: PRIMARY CARE | Facility: CLINIC | Age: 74
End: 2024-06-18
Payer: MEDICARE

## 2024-06-25 ENCOUNTER — APPOINTMENT (OUTPATIENT)
Dept: PRIMARY CARE | Facility: CLINIC | Age: 74
End: 2024-06-25
Payer: MEDICARE

## 2024-07-02 ENCOUNTER — APPOINTMENT (OUTPATIENT)
Dept: RADIOLOGY | Facility: CLINIC | Age: 74
End: 2024-07-02
Payer: MEDICARE

## 2024-07-18 ENCOUNTER — APPOINTMENT (OUTPATIENT)
Dept: RADIOLOGY | Facility: CLINIC | Age: 74
End: 2024-07-18
Payer: MEDICARE

## 2024-07-24 ENCOUNTER — HOSPITAL ENCOUNTER (OUTPATIENT)
Dept: RADIOLOGY | Facility: CLINIC | Age: 74
Discharge: HOME | End: 2024-07-24
Payer: MEDICARE

## 2024-07-24 VITALS — WEIGHT: 96 LBS | BODY MASS INDEX: 17.01 KG/M2 | HEIGHT: 63 IN

## 2024-07-24 DIAGNOSIS — Z12.31 ENCOUNTER FOR SCREENING MAMMOGRAM FOR BREAST CANCER: ICD-10-CM

## 2024-07-24 PROCEDURE — 77067 SCR MAMMO BI INCL CAD: CPT

## 2024-08-14 ENCOUNTER — HOSPITAL ENCOUNTER (OUTPATIENT)
Dept: RADIOLOGY | Facility: CLINIC | Age: 74
Discharge: HOME | End: 2024-08-14
Payer: MEDICARE

## 2024-08-14 DIAGNOSIS — Z78.0 ASYMPTOMATIC MENOPAUSAL STATE: ICD-10-CM

## 2024-08-14 PROCEDURE — 77080 DXA BONE DENSITY AXIAL: CPT | Performed by: RADIOLOGY

## 2024-08-14 PROCEDURE — 77080 DXA BONE DENSITY AXIAL: CPT

## 2024-08-14 ASSESSMENT — LIFESTYLE VARIABLES
CURRENT_SMOKER: N
3_OR_MORE_DRINKS_PER_DAY: N

## 2024-08-15 ENCOUNTER — APPOINTMENT (OUTPATIENT)
Dept: OTOLARYNGOLOGY | Facility: CLINIC | Age: 74
End: 2024-08-15
Payer: MEDICARE

## 2024-09-10 ENCOUNTER — APPOINTMENT (OUTPATIENT)
Dept: PRIMARY CARE | Facility: CLINIC | Age: 74
End: 2024-09-10
Payer: MEDICARE

## 2024-09-10 VITALS
HEART RATE: 76 BPM | OXYGEN SATURATION: 98 % | WEIGHT: 96 LBS | BODY MASS INDEX: 17.01 KG/M2 | HEIGHT: 63 IN | DIASTOLIC BLOOD PRESSURE: 76 MMHG | SYSTOLIC BLOOD PRESSURE: 158 MMHG

## 2024-09-10 DIAGNOSIS — M81.0 OSTEOPOROSIS, UNSPECIFIED OSTEOPOROSIS TYPE, UNSPECIFIED PATHOLOGICAL FRACTURE PRESENCE: Primary | ICD-10-CM

## 2024-09-10 PROCEDURE — 99214 OFFICE O/P EST MOD 30 MIN: CPT | Performed by: FAMILY MEDICINE

## 2024-09-10 PROCEDURE — 1123F ACP DISCUSS/DSCN MKR DOCD: CPT | Performed by: FAMILY MEDICINE

## 2024-09-10 PROCEDURE — G2211 COMPLEX E/M VISIT ADD ON: HCPCS | Performed by: FAMILY MEDICINE

## 2024-09-10 PROCEDURE — 1159F MED LIST DOCD IN RCRD: CPT | Performed by: FAMILY MEDICINE

## 2024-09-10 PROCEDURE — 1160F RVW MEDS BY RX/DR IN RCRD: CPT | Performed by: FAMILY MEDICINE

## 2024-09-10 PROCEDURE — 1036F TOBACCO NON-USER: CPT | Performed by: FAMILY MEDICINE

## 2024-09-10 PROCEDURE — 1157F ADVNC CARE PLAN IN RCRD: CPT | Performed by: FAMILY MEDICINE

## 2024-09-10 NOTE — PROGRESS NOTES
"Subjective   Patient ID: Claudia Patten is a 74 y.o. female who presents for Follow-up (6 Week).    HPI   The patient states that she did not start the Prozac for her anxiety because she is afraid to take it since she is living on her own and is not sure how her body will react to this medication. After careful consideration, she feels like she is able to manage her anxiety well on her own. Her DEXA showed osteoporosis.     Review of Systems  Constitutional: No fever or chills  Cardiovascular: no chest pain, no palpitations and no syncope.   Respiratory: no cough, no shortness of breath during exertion and no shortness of breath at rest.   Gastrointestinal: no abdominal pain, no nausea and no vomiting.  Neuro: No Headache, no dizziness    Objective   /76   Pulse 76   Ht 1.6 m (5' 3\")   Wt (!) 43.5 kg (96 lb)   SpO2 98%   BMI 17.01 kg/m²     Physical Exam  Constitutional: Alert and in no acute distress. Well developed, well nourished  Head and Face: Head and face: Normal.    Cardiovascular: Heart rate and rhythm were normal, normal S1 and S2. No peripheral edema.   Pulmonary: No respiratory distress. Clear bilateral breath sounds.  Musculoskeletal: Gait and station: Normal. Muscle strength/tone: Normal.   Skin: Normal skin color and pigmentation, normal skin turgor, and no rash.    Psychiatric: Judgment and insight: Intact. Mood and affect: Normal.    Lab Results   Component Value Date    WBC 3.9 (L) 04/25/2024    HGB 13.9 04/25/2024    HCT 43.0 04/25/2024     04/25/2024    CHOL 258 (H) 04/25/2024    TRIG 57 04/25/2024    .0 04/25/2024    ALT 24 04/25/2024    AST 33 04/25/2024     04/25/2024    K 3.8 04/25/2024     04/25/2024    CREATININE 0.58 04/25/2024    BUN 20 04/25/2024    CO2 31 04/25/2024    TSH 2.51 04/25/2024    HGBA1C 5.4 04/25/2024       XR DEXA bone density  Narrative: Interpreted By:  Sommer Sosa,   STUDY:  DEXA BONE DENSITY8/14/2024 3:43 pm    "   INDICATION:  Signs/Symptoms:See Associated Diagnosis. The patient is a 73 y/o  year old F.      COMPARISON:  12/08/2021      ACCESSION NUMBER(S):  [id number]      ORDERING CLINICIAN:  PRUDENCIO FRYE      TECHNIQUE:  DEXA BONE DENSITY      FINDINGS:  SPINE L1-L4  Bone Mineral Density: 0.853  T-Score -1.8  Z-Score 0.7  Bone Mineral Density change vs baseline:  1.7  Bone Mineral Density change vs previous: 1.7      LEFT FEMUR -TOTAL  Bone Mineral Density: 0.618  T-Score -2.7   Z-Score  -0.8  Bone Mineral Density change vs baseline: -3.8  Bone Mineral Density change vs previous: -3.8      LEFT FEMUR -NECK  Bone Mineral Density: 0.577  T-Score -2.4  Z-Score -0.3          World Health Organization (WHO) criteria for post-menopausal,   Women:  Normal:         T-score at or above -1 SD  Osteopenia:   T-score between -1 and -2.5 SD  Osteoporosis: T-score at or below -2.5 SD          10-year Fracture Risk:  Major Osteoporotic Fracture  Not reported  Hip Fracture                        Not reported      Note:  If no FRAX score is reported, it is because:  Some T-score for Spine Total or Hip Total or Femoral Neck at or  below-2.5.      This exam was performed at [facility] on a [email or web address]  [facility] [facility] Dexa Unit.          Impression: DEXA:  According to World Health Organization criteria,  classification is osteoporosis.      Followup recommended in two years or sooner as clinically warranted.      All images and detailed analysis are available on the  Radiology  PACS.      MACRO:  None      Signed by: Sommer Sosa 8/14/2024 4:43 PM  Dictation workstation:   [id number]      Assessment/Plan   Assessment & Plan  Osteoporosis, unspecified osteoporosis type, unspecified pathological fracture presence  Rheumatology referral.  Orders:    Referral to Rheumatology; Future      Anxiety: The patient feels like she is able to manage her anxiety well without medications.    Your yearly Physical is due in: May 2025    When you call the office for your yearly Physical, please ask them to inform me to order your blood work, so that you can get the fasting blood work before your appointment and we can discuss the results at your physical.      Please call me if any questions arise from now until your next visit. I will call you after I am done seeing patients. A Doctor is always available by phone when the office is closed. Please feel free to call for help with any problem that you feel shouldn't wait until the office re-opens.     Scribe Attestation  By signing my name below, IJessi Scribe   attest that this documentation has been prepared under the direction and in the presence of Chika Nicolas MD.

## 2024-10-02 ENCOUNTER — APPOINTMENT (OUTPATIENT)
Dept: OTOLARYNGOLOGY | Facility: CLINIC | Age: 74
End: 2024-10-02
Payer: MEDICARE

## 2024-10-02 VITALS — WEIGHT: 98 LBS | BODY MASS INDEX: 17.36 KG/M2 | HEIGHT: 63 IN

## 2024-10-02 DIAGNOSIS — H90.3 SENSORINEURAL HEARING LOSS (SNHL) OF BOTH EARS: ICD-10-CM

## 2024-10-02 DIAGNOSIS — H92.01 RIGHT EAR PAIN: ICD-10-CM

## 2024-10-02 DIAGNOSIS — H61.23 BILATERAL IMPACTED CERUMEN: Primary | ICD-10-CM

## 2024-10-02 PROCEDURE — 1157F ADVNC CARE PLAN IN RCRD: CPT | Performed by: NURSE PRACTITIONER

## 2024-10-02 PROCEDURE — 1160F RVW MEDS BY RX/DR IN RCRD: CPT | Performed by: NURSE PRACTITIONER

## 2024-10-02 PROCEDURE — 1159F MED LIST DOCD IN RCRD: CPT | Performed by: NURSE PRACTITIONER

## 2024-10-02 PROCEDURE — 69210 REMOVE IMPACTED EAR WAX UNI: CPT | Performed by: NURSE PRACTITIONER

## 2024-10-02 PROCEDURE — 1123F ACP DISCUSS/DSCN MKR DOCD: CPT | Performed by: NURSE PRACTITIONER

## 2024-10-02 PROCEDURE — 99213 OFFICE O/P EST LOW 20 MIN: CPT | Performed by: NURSE PRACTITIONER

## 2024-10-02 PROCEDURE — 1036F TOBACCO NON-USER: CPT | Performed by: NURSE PRACTITIONER

## 2024-10-02 NOTE — PROGRESS NOTES
Subjective   Patient ID: Claudia Patten is a 74 y.o. female who presents for Cerumen Impaction.  HPI  Patient has history of cerumen impactions and bilateral sensorineural hearing loss.  She wears bilateral hearing aids with good benefit.  Today, she reports recent intermittent otalgia to the back of her right outer ear.  She reports is seems to happen when wearing her hearing aids and glasses for extended periods of time.    Review of Systems  A comprehensive or 10 points review of the patient´s constitutional, neurological, HEENT, pulmonary, cardiovascular and genito-urinary systems showed only those mentioned in history of present illness.  Objective   Physical Exam  Constitutional: no fever, chills, weight loss or weight gain   General appearance: Appears well, well-nourished, well groomed. No acute distress.   Communication: Normal communication   Psychiatric: Oriented to person, place and time. Normal mood and affect.   Neurologic: Cranial nerves II-XII grossly intact and symmetric bilaterally.   Head and Face:   Head: Atraumatic with no masses, lesions or scarring.   Face: Normal symmetry, no paralysis, synkinesis or facial tic. No scars or deformities.     Eyes: Conjunctiva not edematous or erythematous   Ears: External inspection of ears with no deformity, scars or masses with the exception of small indentation to the posterior aspect of the right auricle which is slightly erythematous.  Under the microscope, there is no skin breakdown.  On the left, she has a similar indentation but no redness.  Bilateral canals with cerumen impactions.     Neck: Normal appearing, symmetric, trachea midline.   Cardiovascular: Examination of peripheral vascular system shows no clubbing or cyanosis.   Respiratory: No respiratory distress increased work of breathing. Inspection of the chest with symmetric chest expansion and normal respiratory effort.   Skin: No rashes in the head or neck    Assessment/Plan     This  patient presents for subsequent evaluation of acute acquired bilateral cerumen impaction and right-sided otalgia as well as chronic bilateral sensorineural hearing loss.    Reassurance given that otologic exam is normal after cleaning.  I believe the redness to right posterior auricle is due to her hearing aids and glasses rubbing on it.  I recommended using a moleskin sticker to prevent the irritation.  She may follow-up as needed.  All questions were answered to patient's satisfaction.    This note was created using speech recognition transcription software. Despite proofreading, several typographical errors might be present that might affect the meaning of the content. Please call with any questions.    Patient ID: Claudia Patten is a 74 y.o. female.    Ear cerumen removal    Date/Time: 10/2/2024 1:27 PM    Performed by: JOBY López  Authorized by: JOBY López    Consent:     Consent obtained:  Verbal    Consent given by:  Patient    Risks discussed:  Pain    Alternatives discussed:  No treatment  Procedure details:     Location:  L ear and R ear    Procedure type: curette      Procedure outcomes: cerumen removed    Post-procedure details:     Inspection:  No bleeding, ear canal clear and TM intact    Hearing quality:  Normal    Procedure completion:  Tolerated well, no immediate complications

## 2024-10-03 ENCOUNTER — APPOINTMENT (OUTPATIENT)
Dept: OTOLARYNGOLOGY | Facility: CLINIC | Age: 74
End: 2024-10-03
Payer: MEDICARE

## 2024-10-31 ENCOUNTER — APPOINTMENT (OUTPATIENT)
Dept: RHEUMATOLOGY | Facility: CLINIC | Age: 74
End: 2024-10-31
Payer: MEDICARE

## 2024-11-12 ENCOUNTER — APPOINTMENT (OUTPATIENT)
Dept: GASTROENTEROLOGY | Facility: EXTERNAL LOCATION | Age: 74
End: 2024-11-12
Payer: MEDICARE

## 2024-11-12 DIAGNOSIS — Z86.0101 HISTORY OF ADENOMATOUS POLYP OF COLON: Primary | ICD-10-CM

## 2024-11-12 DIAGNOSIS — D12.3 BENIGN NEOPLASM OF TRANSVERSE COLON: ICD-10-CM

## 2024-11-12 DIAGNOSIS — Z12.11 ENCOUNTER FOR SCREENING FOR MALIGNANT NEOPLASM OF COLON: ICD-10-CM

## 2024-11-12 DIAGNOSIS — D12.0 BENIGN NEOPLASM OF CECUM: ICD-10-CM

## 2024-11-12 PROCEDURE — 88305 TISSUE EXAM BY PATHOLOGIST: CPT | Performed by: STUDENT IN AN ORGANIZED HEALTH CARE EDUCATION/TRAINING PROGRAM

## 2024-11-12 PROCEDURE — 1123F ACP DISCUSS/DSCN MKR DOCD: CPT | Performed by: INTERNAL MEDICINE

## 2024-11-12 PROCEDURE — 88305 TISSUE EXAM BY PATHOLOGIST: CPT

## 2024-11-12 PROCEDURE — 1157F ADVNC CARE PLAN IN RCRD: CPT | Performed by: INTERNAL MEDICINE

## 2024-11-12 PROCEDURE — 45385 COLONOSCOPY W/LESION REMOVAL: CPT | Performed by: INTERNAL MEDICINE

## 2024-11-13 ENCOUNTER — LAB REQUISITION (OUTPATIENT)
Dept: LAB | Facility: HOSPITAL | Age: 74
End: 2024-11-13
Payer: MEDICARE

## 2024-11-19 LAB
LABORATORY COMMENT REPORT: NORMAL
PATH REPORT.FINAL DX SPEC: NORMAL
PATH REPORT.GROSS SPEC: NORMAL
PATH REPORT.TOTAL CANCER: NORMAL
RESIDENT REVIEW: NORMAL

## 2024-11-20 ENCOUNTER — TELEPHONE (OUTPATIENT)
Dept: GASTROENTEROLOGY | Facility: CLINIC | Age: 74
End: 2024-11-20
Payer: MEDICARE

## 2024-11-20 NOTE — TELEPHONE ENCOUNTER
"Pt called asking about the results that came up in her my chart she doesn't understand them. She would like a call back.         Surgical Pathology Exam: [id number]  Order: [id number]   Collected 11/12/2024 08:26       Status: Final result       Visible to patient: Yes (seen)    0 Result Notes      Component    FINAL DIAGNOSIS   COLON, POLYPS (CECUM x2 AND TRANSVERSE x1), POLYPECTOMIES:  - Fragments of sessile serrated adenoma.      Electronically signed by Deloris Peck MD on 11/19/2024 at 1615        By the signature on this report, the individual or group listed as making the Final Interpretation/Diagnosis certifies that they have reviewed this case.    Resident Review    The gross and/or microscopic findings were reviewed in conjunction with pathology fellow, Racquel Gaitan MD.      Gross Description    Received in formalin, labeled with the patient's name and hospital number and \"1, colon-cecum x 2, transverse x 1, polyp, cold snare\", are multiple fragments of tan, soft tissue aggregating to 1.4 x 0.7 x 0.2 cm. The specimen is submitted in toto in one cassette.  SMS   Resulting Agency [facility]             Specimen Collected: 11/12/24 08:26 Last Resulted: 11/19/24 16:15       Order Details        View Encounter        Lab and Collection Details        Routing        Result History     View All Conversations on this Encounter     Scans on Order [id number]    Lab Result Document - Document on 11/19/2024  4:16 PM         Result Care Coordination      Patient Communication     Add Comments   Seen Back to Top     Result Report    Surgical Pathology Exam (Order #[id number]) on 11/13/24     Lab Component SmartPhrase Guide    Surgical Pathology Exam (Order #[id number]) on 11/13/24     "

## 2024-11-21 NOTE — PROGRESS NOTES
"Subjective   74 y.o. female who I am asked to see in consultation for New Patient Visit (New patient, referred by PCP Dr. Nicolas, for Osteoporosis. Patient says she was managing well on Prolia, bone density showed improvement, but private Dentist (Dr. Sherman Marie) told her they wouldn't treat her if she continued to take Prolia, so she discontinued it. Patient would like to discuss other treatment options. ).    HPI  Osteopenia late 40 s PATRICIA age 37  In her 40 s took estrogen helped bone into 49    Alendronate 3 years and stopped bec of fear of side effects off now took for 5 years , 10 years ago     Started on prolia 3 doses.  Off now for 2 years   Then Dr. Coburn dentist told her to never take any op meds or he    Osteoporosis Risk Factors     Personal Hx of fracture: ?fx knees  Hx of fracture in Mother: no did fx   Height loss:  1 in;ch  Tobacco use: no  Alcoholism: wine not daily   Recurrent falls: no  Inadequate physical activity: not lately 5 years lots of walkings and stairs   Calcium: none or occ   Vit D: 7283-4854 daily ; lab 41 in 2024    Hx of GERD or other esophageal disease:    Plans for invasive dental procedures:      ROS    /79 (Patient Position: Sitting)   Pulse 81   Ht 1.6 m (5' 3\")   Wt (!) 44.4 kg (97 lb 12.8 oz)   BMI 17.32 kg/m²     PHYSICAL EXAM    Imaging  Bone Density: 2024   COMPARISON:  12/08/2021      ACCESSION NUMBER(S):  [id number]      ORDERING CLINICIAN:  PRUDENCIO NICOLAS      TECHNIQUE:  DEXA BONE DENSITY      FINDINGS:  SPINE L1-L4  Bone Mineral Density: 0.853  T-Score -1.8  Z-Score 0.7  Bone Mineral Density change vs baseline:  1.7  Bone Mineral Density change vs previous: 1.7      LEFT FEMUR -TOTAL  Bone Mineral Density: 0.618  T-Score -2.7   Z-Score  -0.8  Bone Mineral Density change vs baseline: -3.8  Bone Mineral Density change vs previous: -3.8      LEFT FEMUR -NECK  Bone Mineral Density: 0.577  T-Score -2.4  Z-Score -0.3    FRAX SCORE     Current Outpatient Medications " "  Medication Sig Dispense Refill    ascorbic acid, vitamin C, 100 mg chewable tablet Chew.      biotin 1 mg tablet Take 1 tablet (1 mg) by mouth once daily.      calcium carbonate-vitamin D3 500 mg-10 mcg (400 unit) chewable tablet Chew twice a day.      cholecalciferol (Vitamin D-3) 25 MCG (1000 UT) tablet Take 1 tablet (25 mcg) by mouth once daily.      cyanocobalamin (Vitamin B-12) 500 mcg tablet Take by mouth.      famotidine (Pepcid) 20 mg tablet Take 1 tablet (20 mg) by mouth. (Patient taking differently: Take 1 tablet (20 mg) by mouth if needed for indigestion or heartburn.)      sod sulf-pot chloride-mag sulf (Sutab) 1.479-0.188- 0.225 gram tablet Starting at 6pm open one bottle of pills and fill glass provided with water and drink according to prep sheet. Start the 2nd bottle with directions on prep sheet 5 hours before procedure time (Patient not taking: Reported on 11/22/2024) 24 tablet 0     No current facility-administered medications for this visit.         Lab Review  Lab Results   Component Value Date    CALCIUM 8.8 04/25/2024     Lab Results   Component Value Date    CREATININE 0.58 04/25/2024     No results found for: \"EGFRMUTATION\"  Lab Results   Component Value Date    PTH 67.7 05/21/2019     Lab Results   Component Value Date    VITD25 41 04/25/2024     Lab Results   Component Value Date    TSH 2.51 04/25/2024     No components found for: \"CMP\"     Assessment/Plan   Diagnoses and all orders for this visit:  Osteoporosis, unspecified osteoporosis type, unspecified pathological fracture presence  -     Referral to Rheumatology    Osteopenia to Osteoporosis   Dentist will not take care of her if she is on ANY OP med!!  Prior Estrogen, then alendronate and then prolia but none of later for 2 yrs    DEXA not done on same machine   Loss of density in femur 3%   Stable other sites    So, at this time will get CTX and decide.     Repeat DXA in   "

## 2024-11-22 ENCOUNTER — APPOINTMENT (OUTPATIENT)
Dept: RHEUMATOLOGY | Facility: CLINIC | Age: 74
End: 2024-11-22
Payer: MEDICARE

## 2024-11-22 VITALS
SYSTOLIC BLOOD PRESSURE: 147 MMHG | DIASTOLIC BLOOD PRESSURE: 79 MMHG | HEART RATE: 81 BPM | WEIGHT: 97.8 LBS | BODY MASS INDEX: 17.33 KG/M2 | HEIGHT: 63 IN

## 2024-11-22 DIAGNOSIS — M81.0 OSTEOPOROSIS, UNSPECIFIED OSTEOPOROSIS TYPE, UNSPECIFIED PATHOLOGICAL FRACTURE PRESENCE: ICD-10-CM

## 2024-11-22 PROCEDURE — 1123F ACP DISCUSS/DSCN MKR DOCD: CPT | Performed by: INTERNAL MEDICINE

## 2024-11-22 PROCEDURE — 99204 OFFICE O/P NEW MOD 45 MIN: CPT | Performed by: INTERNAL MEDICINE

## 2024-11-22 PROCEDURE — 1126F AMNT PAIN NOTED NONE PRSNT: CPT | Performed by: INTERNAL MEDICINE

## 2024-11-22 PROCEDURE — 1159F MED LIST DOCD IN RCRD: CPT | Performed by: INTERNAL MEDICINE

## 2024-11-22 PROCEDURE — 1157F ADVNC CARE PLAN IN RCRD: CPT | Performed by: INTERNAL MEDICINE

## 2024-11-22 ASSESSMENT — PAIN SCALES - GENERAL: PAINLEVEL_OUTOF10: 0-NO PAIN

## 2024-11-22 NOTE — LETTER
"November 22, 2024     Chika Nicolas MD  [address]   [address]  [facility] [address]    Patient: Claudia Patten   YOB: 1950   Date of Visit: 11/22/2024       Dear Dr. Chika Nicolas MD:    Thank you for referring Claudia Patten to me for evaluation. Below are my notes for this consultation.  If you have questions, please do not hesitate to call me. I look forward to following your patient along with you.       Sincerely,     Harish Espinal, DO      CC: No Recipients  ______________________________________________________________________________________    Subjective  74 y.o. female who I am asked to see in consultation for New Patient Visit (New patient, referred by PCP Dr. Nicolas, for Osteoporosis. Patient says she was managing well on Prolia, bone density showed improvement, but private Dentist (Dr. Sherman Marie) told her they wouldn't treat her if she continued to take Prolia, so she discontinued it. Patient would like to discuss other treatment options. ).    HPI  Osteopenia late 40 s PATRICIA age 37  In her 40 s took estrogen helped bone into 49    Alendronate 3 years and stopped bec of fear of side effects off now took for 5 years , 10 years ago     Started on prolia 3 doses.  Off now for 2 years   Then Dr. Coburn dentist told her to never take any op meds or he    Osteoporosis Risk Factors     Personal Hx of fracture: ?fx knees  Hx of fracture in Mother: no did fx   Height loss:  1 in;ch  Tobacco use: no  Alcoholism: wine not daily   Recurrent falls: no  Inadequate physical activity: not lately 5 years lots of walkings and stairs   Calcium: none or occ   Vit D: 9777-0895 daily ; lab 41 in 2024    Hx of GERD or other esophageal disease:    Plans for invasive dental procedures:      ROS    /79 (Patient Position: Sitting)   Pulse 81   Ht 1.6 m (5' 3\")   Wt (!) 44.4 kg (97 lb 12.8 oz)   BMI 17.32 kg/m²     PHYSICAL EXAM    Imaging  Bone Density: 2024   COMPARISON:  12/08/2021    " "  ACCESSION NUMBER(S):  [id number]      ORDERING CLINICIAN:  PRUDENCIO FRYE      TECHNIQUE:  DEXA BONE DENSITY      FINDINGS:  SPINE L1-L4  Bone Mineral Density: 0.853  T-Score -1.8  Z-Score 0.7  Bone Mineral Density change vs baseline:  1.7  Bone Mineral Density change vs previous: 1.7      LEFT FEMUR -TOTAL  Bone Mineral Density: 0.618  T-Score -2.7   Z-Score  -0.8  Bone Mineral Density change vs baseline: -3.8  Bone Mineral Density change vs previous: -3.8      LEFT FEMUR -NECK  Bone Mineral Density: 0.577  T-Score -2.4  Z-Score -0.3    FRAX SCORE     Current Outpatient Medications   Medication Sig Dispense Refill   • ascorbic acid, vitamin C, 100 mg chewable tablet Chew.     • biotin 1 mg tablet Take 1 tablet (1 mg) by mouth once daily.     • calcium carbonate-vitamin D3 500 mg-10 mcg (400 unit) chewable tablet Chew twice a day.     • cholecalciferol (Vitamin D-3) 25 MCG (1000 UT) tablet Take 1 tablet (25 mcg) by mouth once daily.     • cyanocobalamin (Vitamin B-12) 500 mcg tablet Take by mouth.     • famotidine (Pepcid) 20 mg tablet Take 1 tablet (20 mg) by mouth. (Patient taking differently: Take 1 tablet (20 mg) by mouth if needed for indigestion or heartburn.)     • sod sulf-pot chloride-mag sulf (Sutab) 1.479-0.188- 0.225 gram tablet Starting at 6pm open one bottle of pills and fill glass provided with water and drink according to prep sheet. Start the 2nd bottle with directions on prep sheet 5 hours before procedure time (Patient not taking: Reported on 11/22/2024) 24 tablet 0     No current facility-administered medications for this visit.         Lab Review  Lab Results   Component Value Date    CALCIUM 8.8 04/25/2024     Lab Results   Component Value Date    CREATININE 0.58 04/25/2024     No results found for: \"EGFRMUTATION\"  Lab Results   Component Value Date    PTH 67.7 05/21/2019     Lab Results   Component Value Date    VITD25 41 04/25/2024     Lab Results   Component Value Date    TSH 2.51 " "04/25/2024     No components found for: \"CMP\"     Assessment/Plan  Diagnoses and all orders for this visit:  Osteoporosis, unspecified osteoporosis type, unspecified pathological fracture presence  -     Referral to Rheumatology    Osteopenia to Osteoporosis   Dentist will not take care of her if she is on ANY OP med!!  Prior Estrogen, then alendronate and then prolia but none of later for 2 yrs    DEXA not done on same machine   Loss of density in femur 3%   Stable other sites    So, at this time will get CTX and decide.     Repeat DXA in   "

## 2025-01-02 ENCOUNTER — LAB (OUTPATIENT)
Dept: LAB | Facility: LAB | Age: 75
End: 2025-01-02
Payer: MEDICARE

## 2025-01-02 ENCOUNTER — APPOINTMENT (OUTPATIENT)
Dept: OPHTHALMOLOGY | Facility: CLINIC | Age: 75
End: 2025-01-02
Payer: MEDICARE

## 2025-01-02 DIAGNOSIS — H26.493 PCO (POSTERIOR CAPSULAR OPACIFICATION), BILATERAL: ICD-10-CM

## 2025-01-02 DIAGNOSIS — M81.0 OSTEOPOROSIS, UNSPECIFIED OSTEOPOROSIS TYPE, UNSPECIFIED PATHOLOGICAL FRACTURE PRESENCE: ICD-10-CM

## 2025-01-02 DIAGNOSIS — Z96.1 PSEUDOPHAKIA OF BOTH EYES: Primary | ICD-10-CM

## 2025-01-02 PROCEDURE — 92014 COMPRE OPH EXAM EST PT 1/>: CPT | Performed by: OPHTHALMOLOGY

## 2025-01-02 PROCEDURE — 82523 COLLAGEN CROSSLINKS: CPT

## 2025-01-02 PROCEDURE — 36415 COLL VENOUS BLD VENIPUNCTURE: CPT

## 2025-01-02 ASSESSMENT — CONF VISUAL FIELD
OD_INFERIOR_TEMPORAL_RESTRICTION: 0
OD_SUPERIOR_TEMPORAL_RESTRICTION: 0
OS_NORMAL: 1
OS_INFERIOR_TEMPORAL_RESTRICTION: 0
OS_INFERIOR_NASAL_RESTRICTION: 0
OD_NORMAL: 1
OD_SUPERIOR_NASAL_RESTRICTION: 0
OD_INFERIOR_NASAL_RESTRICTION: 0
OS_SUPERIOR_NASAL_RESTRICTION: 0
OS_SUPERIOR_TEMPORAL_RESTRICTION: 0

## 2025-01-02 ASSESSMENT — REFRACTION_WEARINGRX
OS_CYLINDER: -0.75
OD_CYLINDER: -1.50
OS_SPHERE: +0.25
OS_ADD: +2.50
OS_AXIS: 110
OD_SPHERE: +0.50
OD_AXIS: 095
OD_ADD: +2.50

## 2025-01-02 ASSESSMENT — REFRACTION_MANIFEST
OD_SPHERE: +0.50
OS_SPHERE: +0.25
OS_AXIS: 110
OS_CYLINDER: -0.75
OD_ADD: 20/20
OS_ADD: 20/20
OD_CYLINDER: -0.75
OD_AXIS: 090

## 2025-01-02 ASSESSMENT — EXTERNAL EXAM - LEFT EYE: OS_EXAM: NORMAL

## 2025-01-02 ASSESSMENT — CUP TO DISC RATIO
OS_RATIO: 0.3
OD_RATIO: 0.3

## 2025-01-02 ASSESSMENT — SLIT LAMP EXAM - LIDS
COMMENTS: NORMAL
COMMENTS: NORMAL

## 2025-01-02 ASSESSMENT — TONOMETRY
IOP_METHOD: GOLDMANN APPLANATION
OS_IOP_MMHG: 16
OD_IOP_MMHG: 15

## 2025-01-02 ASSESSMENT — VISUAL ACUITY
OD_CC: 20/20
OS_CC: 20/20
METHOD: SNELLEN - LINEAR
CORRECTION_TYPE: GLASSES

## 2025-01-02 ASSESSMENT — EXTERNAL EXAM - RIGHT EYE: OD_EXAM: NORMAL

## 2025-01-02 NOTE — PROGRESS NOTES
Assessment/Plan   Diagnoses and all orders for this visit:  Pseudophakia of both eyes  stable  PCO (posterior capsular opacification), bilateral  Not visually significant observe  -Followup in 1 year or as needed for symptoms (RSVP: redness, sensitivity to light, vision loss, pain)

## 2025-01-04 LAB — COLLAGEN CTX SERPL-MCNC: 379 PG/ML

## 2025-01-05 ENCOUNTER — PATIENT MESSAGE (OUTPATIENT)
Dept: RHEUMATOLOGY | Facility: CLINIC | Age: 75
End: 2025-01-05
Payer: MEDICARE

## 2025-01-08 ENCOUNTER — APPOINTMENT (OUTPATIENT)
Dept: OTOLARYNGOLOGY | Facility: CLINIC | Age: 75
End: 2025-01-08
Payer: MEDICARE

## 2025-01-08 VITALS — HEIGHT: 63 IN | BODY MASS INDEX: 17.72 KG/M2 | WEIGHT: 100 LBS

## 2025-01-08 DIAGNOSIS — H90.3 SENSORINEURAL HEARING LOSS (SNHL) OF BOTH EARS: ICD-10-CM

## 2025-01-08 DIAGNOSIS — H61.23 BILATERAL IMPACTED CERUMEN: Primary | ICD-10-CM

## 2025-01-08 PROCEDURE — 1159F MED LIST DOCD IN RCRD: CPT | Performed by: NURSE PRACTITIONER

## 2025-01-08 PROCEDURE — 1160F RVW MEDS BY RX/DR IN RCRD: CPT | Performed by: NURSE PRACTITIONER

## 2025-01-08 PROCEDURE — 99213 OFFICE O/P EST LOW 20 MIN: CPT | Performed by: NURSE PRACTITIONER

## 2025-01-08 PROCEDURE — 1036F TOBACCO NON-USER: CPT | Performed by: NURSE PRACTITIONER

## 2025-01-08 PROCEDURE — 1157F ADVNC CARE PLAN IN RCRD: CPT | Performed by: NURSE PRACTITIONER

## 2025-01-08 PROCEDURE — 1123F ACP DISCUSS/DSCN MKR DOCD: CPT | Performed by: NURSE PRACTITIONER

## 2025-01-08 ASSESSMENT — PATIENT HEALTH QUESTIONNAIRE - PHQ9
1. LITTLE INTEREST OR PLEASURE IN DOING THINGS: NOT AT ALL
2. FEELING DOWN, DEPRESSED OR HOPELESS: NOT AT ALL
SUM OF ALL RESPONSES TO PHQ9 QUESTIONS 1 AND 2: 0

## 2025-01-08 NOTE — PROGRESS NOTES
Subjective   Patient ID: Claudia Patten is a 74 y.o. female who presents for Cerumen Impaction.  HPI  Patient has history of cerumen impactions and bilateral sensorineural hearing loss.  She wears bilateral hearing aids with good benefit.  Today, she denies any otologic complaints, but is seeing her outside audiologist later this week to discuss the possibility of new hearing aids.    Review of Systems  A comprehensive or 10 points review of the patient´s constitutional, neurological, HEENT, pulmonary, cardiovascular and genito-urinary systems showed only those mentioned in history of present illness.  Objective   Physical Exam  Constitutional: no fever, chills, weight loss or weight gain   General appearance: Appears well, well-nourished, well groomed. No acute distress.   Communication: Normal communication   Psychiatric: Oriented to person, place and time. Normal mood and affect.   Neurologic: Cranial nerves II-XII grossly intact and symmetric bilaterally.   Head and Face:   Head: Atraumatic with no masses, lesions or scarring.   Face: Normal symmetry, no paralysis, synkinesis or facial tic. No scars or deformities.     Eyes: Conjunctiva not edematous or erythematous   Ears: External inspection of ears with no deformity, scars or masses. Bilateral canals with cerumen impactions.     Neck: Normal appearing, symmetric, trachea midline.   Cardiovascular: Examination of peripheral vascular system shows no clubbing or cyanosis.   Respiratory: No respiratory distress increased work of breathing. Inspection of the chest with symmetric chest expansion and normal respiratory effort.   Skin: No rashes in the head or neck    Assessment/Plan     This patient presents for subsequent evaluation of acute acquired bilateral cerumen impaction and chronic bilateral sensorineural hearing loss.    Reassurance given that otologic exam is normal after cleaning.  She may follow-up as needed.  All questions were answered to  patient's satisfaction.    This note was created using speech recognition transcription software. Despite proofreading, several typographical errors might be present that might affect the meaning of the content. Please call with any questions.    Patient ID: Claudia Patten is a 74 y.o. female.    Ear cerumen removal    Date/Time: 1/8/2025 2:32 PM    Performed by: JOBY López  Authorized by: JOBY López    Consent:     Consent obtained:  Verbal    Consent given by:  Patient    Risks discussed:  Pain    Alternatives discussed:  No treatment  Procedure details:     Location:  L ear and R ear    Procedure type comment:  Suction and alligator    Procedure outcomes: cerumen removed    Post-procedure details:     Inspection:  No bleeding, ear canal clear and TM intact    Hearing quality:  Improved    Procedure completion:  Tolerated well, no immediate complications

## 2025-01-10 ENCOUNTER — APPOINTMENT (OUTPATIENT)
Dept: RHEUMATOLOGY | Facility: CLINIC | Age: 75
End: 2025-01-10
Payer: MEDICARE

## 2025-02-08 NOTE — PROGRESS NOTES
Subjective   74 y.o. female who I am asked to see in consultation for No chief complaint on file..    HPI last seen      Latest Reference Range & Units 01/02/25 09:47   C-Telopeptide, Beta Cross Linked pg/mL 379       Osteopenia late 40 s PATRICIA age 37  In her 40 s took estrogen helped bone into 49    Alendronate 3 years and stopped bec of fear of side effects off now took for 5 years , 10 years ago     Started on prolia 3 doses.  Off now for 2 years   Then Dr. Coburn dentist told her to never take any op meds or he    Osteoporosis Risk Factors     Personal Hx of fracture: ?fx knees  Hx of fracture in Mother: no did fx   Height loss:  1 in;ch  Tobacco use: no  Alcoholism: wine not daily   Recurrent falls: no  Inadequate physical activity: not lately 5 years lots of walkings and stairs   Calcium: none or occ   Vit D: 0675-0116 daily ; lab 41 in 2024    Hx of GERD or other esophageal disease:    Plans for invasive dental procedures:      ROS    There were no vitals taken for this visit.    PHYSICAL EXAM    Imaging  Bone Density: 2024   COMPARISON:  12/08/2021      ACCESSION NUMBER(S):  [id number]      ORDERING CLINICIAN:  PRUDENCIO FRYE      TECHNIQUE:  DEXA BONE DENSITY      FINDINGS:  SPINE L1-L4  Bone Mineral Density: 0.853  T-Score -1.8  Z-Score 0.7  Bone Mineral Density change vs baseline:  1.7  Bone Mineral Density change vs previous: 1.7      LEFT FEMUR -TOTAL  Bone Mineral Density: 0.618  T-Score -2.7   Z-Score  -0.8  Bone Mineral Density change vs baseline: -3.8  Bone Mineral Density change vs previous: -3.8      LEFT FEMUR -NECK  Bone Mineral Density: 0.577  T-Score -2.4  Z-Score -0.3    FRAX SCORE     Current Outpatient Medications   Medication Sig Dispense Refill    ascorbic acid, vitamin C, 100 mg chewable tablet Chew.      biotin 1 mg tablet Take 1 tablet (1 mg) by mouth once daily.      calcium carbonate-vitamin D3 500 mg-10 mcg (400 unit) chewable tablet Chew twice a day.      cholecalciferol  "(Vitamin D-3) 25 MCG (1000 UT) tablet Take 1 tablet (25 mcg) by mouth once daily.      cyanocobalamin (Vitamin B-12) 500 mcg tablet Take by mouth.      famotidine (Pepcid) 20 mg tablet Take 1 tablet (20 mg) by mouth. (Patient taking differently: Take 1 tablet (20 mg) by mouth if needed for indigestion or heartburn.)      sod sulf-pot chloride-mag sulf (Sutab) 1.479-0.188- 0.225 gram tablet Starting at 6pm open one bottle of pills and fill glass provided with water and drink according to prep sheet. Start the 2nd bottle with directions on prep sheet 5 hours before procedure time (Patient not taking: Reported on 1/8/2025) 24 tablet 0     No current facility-administered medications for this visit.         Lab Review  Lab Results   Component Value Date    CALCIUM 8.8 04/25/2024     Lab Results   Component Value Date    CREATININE 0.58 04/25/2024     No results found for: \"EGFRMUTATION\"  Lab Results   Component Value Date    PTH 67.7 05/21/2019     Lab Results   Component Value Date    VITD25 41 04/25/2024     Lab Results   Component Value Date    TSH 2.51 04/25/2024     No components found for: \"CMP\"     Assessment/Plan   There are no diagnoses linked to this encounter.    Osteopenia to Osteoporosis   Dentist will not take care of her if she is on ANY OP med!!  Prior Estrogen, then alendronate and then prolia but none of later for 2 yrs    DEXA not done on same machine   Loss of density in femur 3%   Stable other sites    So, at this time will get CTX and decide.     Discussed meds in detail  She is not going to see that dentist again  Does not want PTH analogs  She wants Reclast. Discussed all side effects. Sheet given   I referred her to another dentist     Repeat DXA in   "

## 2025-02-10 ENCOUNTER — APPOINTMENT (OUTPATIENT)
Dept: RHEUMATOLOGY | Facility: CLINIC | Age: 75
End: 2025-02-10
Payer: MEDICARE

## 2025-02-10 VITALS
HEART RATE: 88 BPM | OXYGEN SATURATION: 99 % | HEIGHT: 63 IN | BODY MASS INDEX: 17.36 KG/M2 | WEIGHT: 98 LBS | DIASTOLIC BLOOD PRESSURE: 73 MMHG | SYSTOLIC BLOOD PRESSURE: 138 MMHG

## 2025-02-10 DIAGNOSIS — M81.0 OSTEOPOROSIS, UNSPECIFIED OSTEOPOROSIS TYPE, UNSPECIFIED PATHOLOGICAL FRACTURE PRESENCE: Primary | ICD-10-CM

## 2025-02-10 PROCEDURE — 99214 OFFICE O/P EST MOD 30 MIN: CPT | Performed by: INTERNAL MEDICINE

## 2025-02-10 PROCEDURE — 1123F ACP DISCUSS/DSCN MKR DOCD: CPT | Performed by: INTERNAL MEDICINE

## 2025-02-10 PROCEDURE — 1036F TOBACCO NON-USER: CPT | Performed by: INTERNAL MEDICINE

## 2025-02-10 PROCEDURE — 1157F ADVNC CARE PLAN IN RCRD: CPT | Performed by: INTERNAL MEDICINE

## 2025-02-10 PROCEDURE — 1159F MED LIST DOCD IN RCRD: CPT | Performed by: INTERNAL MEDICINE

## 2025-02-18 ENCOUNTER — TELEPHONE (OUTPATIENT)
Dept: PRIMARY CARE | Facility: CLINIC | Age: 75
End: 2025-02-18
Payer: MEDICARE

## 2025-02-18 NOTE — TELEPHONE ENCOUNTER
Fell outside on the ice on Saturday,    Hurt left wrist, and tailbone.. Wondering if she should see you or if you want to order x-rays or should she see Dr. German

## 2025-02-25 ENCOUNTER — APPOINTMENT (OUTPATIENT)
Dept: PRIMARY CARE | Facility: CLINIC | Age: 75
End: 2025-02-25
Payer: MEDICARE

## 2025-02-25 VITALS
WEIGHT: 99 LBS | SYSTOLIC BLOOD PRESSURE: 159 MMHG | HEART RATE: 85 BPM | DIASTOLIC BLOOD PRESSURE: 76 MMHG | OXYGEN SATURATION: 97 % | BODY MASS INDEX: 17.54 KG/M2

## 2025-02-25 DIAGNOSIS — E55.9 VITAMIN D DEFICIENCY: ICD-10-CM

## 2025-02-25 DIAGNOSIS — M81.0 AGE-RELATED OSTEOPOROSIS WITHOUT CURRENT PATHOLOGICAL FRACTURE: ICD-10-CM

## 2025-02-25 DIAGNOSIS — E53.8 VITAMIN B12 DEFICIENCY: ICD-10-CM

## 2025-02-25 DIAGNOSIS — M53.3 TAIL BONE PAIN: ICD-10-CM
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
## 2025-02-25 DIAGNOSIS — M25.532 LEFT WRIST PAIN: ICD-10-CM

## 2025-02-25 DIAGNOSIS — R73.9 ELEVATED BLOOD SUGAR: ICD-10-CM

## 2025-02-25 DIAGNOSIS — E78.2 MODERATE MIXED HYPERLIPIDEMIA NOT REQUIRING STATIN THERAPY: ICD-10-CM

## 2025-02-25 DIAGNOSIS — D70.8 OTHER NEUTROPENIA (CMS-HCC): ICD-10-CM

## 2025-02-25 DIAGNOSIS — R94.6 ABNORMAL THYROID EXAM: ICD-10-CM

## 2025-02-25 PROCEDURE — 1157F ADVNC CARE PLAN IN RCRD: CPT | Performed by: FAMILY MEDICINE

## 2025-02-25 PROCEDURE — 99214 OFFICE O/P EST MOD 30 MIN: CPT | Performed by: FAMILY MEDICINE

## 2025-02-25 PROCEDURE — 1036F TOBACCO NON-USER: CPT | Performed by: FAMILY MEDICINE

## 2025-02-25 PROCEDURE — 1123F ACP DISCUSS/DSCN MKR DOCD: CPT | Performed by: FAMILY MEDICINE

## 2025-02-25 PROCEDURE — 1160F RVW MEDS BY RX/DR IN RCRD: CPT | Performed by: FAMILY MEDICINE

## 2025-02-25 PROCEDURE — 1159F MED LIST DOCD IN RCRD: CPT | Performed by: FAMILY MEDICINE

## 2025-02-25 ASSESSMENT — ENCOUNTER SYMPTOMS
DYSURIA: 0
BOWEL INCONTINENCE: 0
TINGLING: 0
NUMBNESS: 0
LEG PAIN: 0
PARESIS: 0
PARESTHESIAS: 0
WEIGHT LOSS: 0
WEAKNESS: 1
BACK PAIN: 1
HEADACHES: 0
PERIANAL NUMBNESS: 0
FEVER: 0
ABDOMINAL PAIN: 0

## 2025-02-25 NOTE — ASSESSMENT & PLAN NOTE
Orders:    Follow Up In [facility] - PCP - Medicare Annual; Future    Comprehensive Metabolic Panel; Future

## 2025-02-25 NOTE — PROGRESS NOTES
Subjective   Patient ID: Claudia Patten is a 75 y.o. female who presents for Follow-up.    Back Pain  This is a new problem. The current episode started in the past 7 days. The pain is present in the sacro-iliac. The pain does not radiate. The pain is at a severity of 3/10. The pain is The same all the time. The symptoms are aggravated by lying down and sitting. Associated symptoms include pelvic pain and weakness. Pertinent negatives include no abdominal pain, bladder incontinence, bowel incontinence, chest pain, dysuria, fever, headaches, leg pain, numbness, paresis, paresthesias, perianal numbness, tingling or weight loss. Risk factors include history of cancer, history of osteoporosis and recent trauma.      The patient currently is on no prescribed medications. She is taking calcium, vit D, Vitamin C and B12. She feels like she is able to manage her anxiety well on her own. Her DEXA showed osteoporosis.     Today she presents with concerns over a slip and fall 10 days ago while shoveling snow and landed on her tail bone and left wrist. She hit her head; however, does not have pain in her head. She is experiencing tailbone and wrist pain.  She has stiffness in her left wrist and gets pain when she uses her wrist to get up from the floor.  She took Tylenol 500 mg for a day.     Review of Systems  Constitutional: No fever or chills  Cardiovascular: no chest pain, no palpitations and no syncope.   Respiratory: no cough, no shortness of breath during exertion and no shortness of breath at rest.   Gastrointestinal: no abdominal pain, no nausea and no vomiting.  Neuro: No Headache, no dizziness    Objective   /76   Pulse 85   Wt (!) 44.9 kg (99 lb)   SpO2 97%   BMI 17.54 kg/m²     Physical Exam  Constitutional: Alert and in no acute distress. Well developed, well nourished  Head and Face: Head and face: Normal.    Cardiovascular: Heart rate and rhythm were normal, normal S1 and S2. No peripheral edema.    Pulmonary: No respiratory distress. Clear bilateral breath sounds.  Musculoskeletal: Gait and station: Normal. Muscle strength/tone: Normal.   Skin: Normal skin color and pigmentation, normal skin turgor, and no rash.    Psychiatric: Judgment and insight: Intact. Mood and affect: Normal.      Lab Results   Component Value Date    WBC 3.9 (L) 04/25/2024    HGB 13.9 04/25/2024    HCT 43.0 04/25/2024     04/25/2024    CHOL 258 (H) 04/25/2024    TRIG 57 04/25/2024    .0 04/25/2024    ALT 24 04/25/2024    AST 33 04/25/2024     04/25/2024    K 3.8 04/25/2024     04/25/2024    CREATININE 0.58 04/25/2024    BUN 20 04/25/2024    CO2 31 04/25/2024    TSH 2.51 04/25/2024    HGBA1C 5.4 04/25/2024       XR DEXA bone density  Narrative: Interpreted By:  Sommer Sosa,   STUDY:  DEXA BONE DENSITY8/14/2024 3:43 pm      INDICATION:  Signs/Symptoms:See Associated Diagnosis. The patient is a 75 y/o  year old F.      COMPARISON:  12/08/2021      ACCESSION NUMBER(S):  [id number]      ORDERING CLINICIAN:  PRUDENCIO FRYE      TECHNIQUE:  DEXA BONE DENSITY      FINDINGS:  SPINE L1-L4  Bone Mineral Density: 0.853  T-Score -1.8  Z-Score 0.7  Bone Mineral Density change vs baseline:  1.7  Bone Mineral Density change vs previous: 1.7      LEFT FEMUR -TOTAL  Bone Mineral Density: 0.618  T-Score -2.7   Z-Score  -0.8  Bone Mineral Density change vs baseline: -3.8  Bone Mineral Density change vs previous: -3.8      LEFT FEMUR -NECK  Bone Mineral Density: 0.577  T-Score -2.4  Z-Score -0.3          World Health Organization (WHO) criteria for post-menopausal,   Women:  Normal:         T-score at or above -1 SD  Osteopenia:   T-score between -1 and -2.5 SD  Osteoporosis: T-score at or below -2.5 SD          10-year Fracture Risk:  Major Osteoporotic Fracture  Not reported  Hip Fracture                        Not reported      Note:  If no FRAX score is reported, it is because:  Some T-score for Spine Total or Hip  Total or Femoral Neck at or  below-2.5.      This exam was performed at [facility] on a [facility] Dexa Unit.          Impression: DEXA:  According to World Health Organization criteria,  classification is osteoporosis.      Followup recommended in two years or sooner as clinically warranted.      All images and detailed analysis are available on the  Radiology  PACS.      MACRO:  None      Signed by: Sommer Sosa 8/14/2024 4:43 PM  Dictation workstation:   [id number]      Assessment/Plan   Assessment & Plan  Fall due to slipping on ice or snow, initial encounter  Orders:    XR wrist left 3+ views; Future    XR sacrum coccyx 2+ views; Future    Left wrist pain  X-ray wrist ordered  Orders:    XR wrist left 3+ views; Future    Tail bone pain  X-ray sacrum coccyx ordered  Orders:    XR sacrum coccyx 2+ views; Future    Other neutropenia (CMS-HCC)    Orders:    CBC; Future    Age-related osteoporosis without current pathological fracture    Orders:    Follow Up In [facility] - PCP - Medicare Annual; Future    Comprehensive Metabolic Panel; Future    Vitamin D deficiency    Orders:    Vitamin D 25-Hydroxy,Total (for eval of Vitamin D levels); Future    Vitamin B12 deficiency    Orders:    Vitamin B12; Future    Moderate mixed hyperlipidemia not requiring statin therapy    Orders:    Lipid Panel; Future    Elevated blood sugar    Orders:    Hemoglobin A1C; Future    Abnormal thyroid exam    Orders:    TSH with reflex to Free T4 if abnormal; Future        Your yearly Physical is due in:  May 2025  When you call the office for your yearly Physical, please ask them to inform me to order your blood work, so that you can get the fasting blood work before your appointment and we can discuss the results at your physical.      Please call me if any questions arise from now until your next visit. I will call you after I am done seeing patients. A Doctor is always available by phone when the  office is closed. Please feel free to call for help with any problem that you feel shouldn't wait until the office re-opens.     Scribe Attestation  By signing my name below, I, Ines Banks   attest that this documentation has been prepared under the direction and in the presence of Chika Nicolas MD.

## 2025-02-26 ENCOUNTER — HOSPITAL ENCOUNTER (OUTPATIENT)
Dept: RADIOLOGY | Facility: CLINIC | Age: 75
Discharge: HOME | End: 2025-02-26
Payer: MEDICARE
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
## 2025-02-26 DIAGNOSIS — M25.532 LEFT WRIST PAIN: ICD-10-CM

## 2025-02-26 DIAGNOSIS — M53.3 TAIL BONE PAIN: ICD-10-CM

## 2025-02-26 PROCEDURE — 73110 X-RAY EXAM OF WRIST: CPT | Mod: LT

## 2025-02-26 PROCEDURE — 72220 X-RAY EXAM SACRUM TAILBONE: CPT

## 2025-03-03 ENCOUNTER — OFFICE VISIT (OUTPATIENT)
Dept: ORTHOPEDIC SURGERY | Facility: HOSPITAL | Age: 75
End: 2025-03-03
Payer: MEDICARE

## 2025-03-03 DIAGNOSIS — M25.532 LEFT WRIST PAIN: Primary | ICD-10-CM

## 2025-03-03 PROCEDURE — 1159F MED LIST DOCD IN RCRD: CPT | Performed by: STUDENT IN AN ORGANIZED HEALTH CARE EDUCATION/TRAINING PROGRAM

## 2025-03-03 PROCEDURE — 99203 OFFICE O/P NEW LOW 30 MIN: CPT | Performed by: STUDENT IN AN ORGANIZED HEALTH CARE EDUCATION/TRAINING PROGRAM

## 2025-03-03 PROCEDURE — 99213 OFFICE O/P EST LOW 20 MIN: CPT | Performed by: STUDENT IN AN ORGANIZED HEALTH CARE EDUCATION/TRAINING PROGRAM

## 2025-03-03 PROCEDURE — 1123F ACP DISCUSS/DSCN MKR DOCD: CPT | Performed by: STUDENT IN AN ORGANIZED HEALTH CARE EDUCATION/TRAINING PROGRAM

## 2025-03-03 PROCEDURE — 1157F ADVNC CARE PLAN IN RCRD: CPT | Performed by: STUDENT IN AN ORGANIZED HEALTH CARE EDUCATION/TRAINING PROGRAM

## 2025-03-03 ASSESSMENT — PAIN - FUNCTIONAL ASSESSMENT: PAIN_FUNCTIONAL_ASSESSMENT: NO/DENIES PAIN

## 2025-03-03 NOTE — PROGRESS NOTES
History of Present Illness   Patient presents today for evaluation of side: left upper extremity pain.    The patient sustained an acute injury approximately 2 and half weeks ago.  The patient slipped on ice and hit the dorsum of her hand and wrist.  She notes some immediate pain however she had minimal swelling and ecchymosis develop the patient denies any loss of consciousness or additional significant injuries.  The patient denies any current numbness or tingling.  Her pain is overall mild and notes that she does get intermittent pain primarily related to the dorsum of her distal radius.     Past Medical History:   Diagnosis Date    Abnormal weight loss 11/08/2021    Weight loss, unintentional    Contusion of other part of head, initial encounter 08/09/2015    Facial contusion    Cortical age-related cataract, right eye 01/13/2015    Cortical age-related cataract of right eye    Disorder of the skin and subcutaneous tissue, unspecified 07/28/2017    Skin lesion    Encounter for immunization 04/06/2016    Immunization, tetanus-diphtheria    Encounter for screening for other viral diseases 09/28/2020    Screening for viral disease    Hordeolum internum left eye, unspecified eyelid 07/13/2020    Internal hordeolum of left eye    Hordeolum internum left upper eyelid 04/20/2020    Hordeolum internum of left upper eyelid    Impacted cerumen, bilateral 08/18/2021    Impacted cerumen of both ears    Impacted cerumen, bilateral 11/18/2020    Impacted cerumen of both ears    Impacted cerumen, left ear 07/09/2019    Left ear impacted cerumen    Laceration without foreign body of other part of head, initial encounter 08/09/2015    Facial laceration    Localized swelling, mass and lump, unspecified 11/07/2018    Skin nodule    Menopausal and female climacteric states 09/04/2014    Menopausal symptoms    Other specified disorders of left ear 08/18/2021    Fullness in ear, left    Personal history of (healed) traumatic fracture  09/20/2015    History of fracture of patella    Personal history of colonic polyps 10/07/2020    History of colonic polyps    Personal history of nicotine dependence 07/28/2017    History of nicotine dependence    Personal history of other diseases of the musculoskeletal system and connective tissue 04/03/2018    History of osteopenia    Personal history of other diseases of the nervous system and sense organs     History of blepharitis    Personal history of other specified conditions 10/20/2016    History of lymphadenopathy    Personal history of other specified conditions 04/08/2014    History of breast lump    Personal history of other specified conditions 12/09/2015    History of abnormal mammogram    Persons encountering health services in other specified circumstances 04/06/2016    Encounter to establish care    Polyp of colon 10/17/2017    Sessile colonic polyp    Unspecified injury of unspecified lower leg, initial encounter 08/12/2015    Knee injury    Unspecified injury of unspecified lower leg, initial encounter 08/12/2015    Knee injury       Medication Documentation Review Audit       Reviewed by Bere Willingham LPN (Licensed Nurse) on 03/03/25 at 1605      Medication Order Taking? Sig Documenting Provider Last Dose Status   ascorbic acid, vitamin C, 100 mg chewable tablet [id number] No Chew. Historical Provider, MD Taking Active   biotin 1 mg tablet [id number] No Take 1 tablet (1 mg) by mouth once daily. Historical Provider, MD Taking Active   calcium carbonate-vitamin D3 500 mg-10 mcg (400 unit) chewable tablet [id number] No Chew twice a day. Historical Provider, MD Taking Active   cholecalciferol (Vitamin D-3) 25 MCG (1000 UT) tablet [id number] No Take 1 tablet (25 mcg) by mouth once daily. Historical Provider, MD Taking Active   cyanocobalamin (Vitamin B-12) 500 mcg tablet [id number] No Take by mouth. Historical Provider, MD Taking Active   Discontinued 02/25/25 1609   Patient not taking:  Discontinued  25 1609                    Allergies   Allergen Reactions    Meperidine Unknown       Social History     Socioeconomic History    Marital status: Legally      Spouse name: Not on file    Number of children: 1    Years of education: Not on file    Highest education level: Not on file   Occupational History    Not on file   Tobacco Use    Smoking status: Former     Current packs/day: 0.00     Average packs/day: 0.2 packs/day for 50.0 years (10.0 ttl pk-yrs)     Types: Cigarettes     Start date: 1967     Quit date: 2017     Years since quittin.1     Passive exposure: Never    Smokeless tobacco: Never   Substance and Sexual Activity    Alcohol use: Yes     Alcohol/week: 2.0 standard drinks of alcohol     Types: 2 Standard drinks or equivalent per week    Drug use: Never    Sexual activity: Not Currently     Partners: Male     Birth control/protection: Post-menopausal   Other Topics Concern    Not on file   Social History Narrative    Not on file     Social Drivers of Health     Financial Resource Strain: Not on file   Food Insecurity: Not on file   Transportation Needs: Not on file   Physical Activity: Not on file   Stress: Not on file   Social Connections: Not on file   Intimate Partner Violence: Not on file   Housing Stability: Not on file       Past Surgical History:   Procedure Laterality Date    APPENDECTOMY  04/15/2014    Appendectomy    COLONOSCOPY  2015    Complete Colonoscopy    OTHER SURGICAL HISTORY  12/10/2021    Eye surgery    TOTAL ABDOMINAL HYSTERECTOMY W/ BILATERAL SALPINGOOPHORECTOMY  2014    Total Abdominal Hysterectomy With Removal Of Both Ovaries          Review of Systems   GENERAL: Negative  GI: Negative  MUSCULOSKELETAL: See HPI  SKIN: Negative  NEURO:  Negative     Physical Exam:  side: left upper extremity:  Skin healthy to gross inspection, no breakdown  No Swelling / ecchymosis noted  Mildly tender over the dorsum of the distal radius.  Nontender over the  SL or LT interval.  Nontender over the triquetrum.  Nontender over the DRUJ.  DRUJ stable in all positions.  Nontender over the anatomic snuffbox  Intact flexion and extension of 1st IP joint and finger abduction  Sensation intact to light touch medial / ulnar and radial nerve distribution   Good cap refill     Imaging  XR of the left wrist were reviewed in office today dated 2/26/2025  There is a questionable triquetral fracture.     Assessment   Patient with an acute left wrist sprain     Plan:  Pain will be primarily dictating factor.  We discussed that she can discontinue any immobilization if she would like however she may continue to wear it for another 1 to 2 weeks.  She may gradually return activity as tolerated.  Follow-up as needed     Follow-up  as needed

## 2025-03-04 ENCOUNTER — APPOINTMENT (OUTPATIENT)
Dept: PRIMARY CARE | Facility: CLINIC | Age: 75
End: 2025-03-04
Payer: MEDICARE

## 2025-04-09 ENCOUNTER — APPOINTMENT (OUTPATIENT)
Dept: OTOLARYNGOLOGY | Facility: CLINIC | Age: 75
End: 2025-04-09
Payer: MEDICARE

## 2025-04-09 ENCOUNTER — TELEPHONE (OUTPATIENT)
Dept: RHEUMATOLOGY | Facility: CLINIC | Age: 75
End: 2025-04-09

## 2025-04-09 DIAGNOSIS — M81.0 OSTEOPOROSIS, UNSPECIFIED OSTEOPOROSIS TYPE, UNSPECIFIED PATHOLOGICAL FRACTURE PRESENCE: Primary | ICD-10-CM

## 2025-04-09 DIAGNOSIS — H90.3 SENSORINEURAL HEARING LOSS (SNHL) OF BOTH EARS: ICD-10-CM

## 2025-04-09 DIAGNOSIS — H61.23 BILATERAL IMPACTED CERUMEN: Primary | ICD-10-CM

## 2025-04-09 PROCEDURE — 1036F TOBACCO NON-USER: CPT | Performed by: NURSE PRACTITIONER

## 2025-04-09 PROCEDURE — 1123F ACP DISCUSS/DSCN MKR DOCD: CPT | Performed by: NURSE PRACTITIONER

## 2025-04-09 PROCEDURE — 1157F ADVNC CARE PLAN IN RCRD: CPT | Performed by: NURSE PRACTITIONER

## 2025-04-09 PROCEDURE — 1160F RVW MEDS BY RX/DR IN RCRD: CPT | Performed by: NURSE PRACTITIONER

## 2025-04-09 PROCEDURE — 99213 OFFICE O/P EST LOW 20 MIN: CPT | Performed by: NURSE PRACTITIONER

## 2025-04-09 PROCEDURE — 1159F MED LIST DOCD IN RCRD: CPT | Performed by: NURSE PRACTITIONER

## 2025-04-09 PROCEDURE — 69210 REMOVE IMPACTED EAR WAX UNI: CPT | Performed by: NURSE PRACTITIONER

## 2025-04-09 RX ORDER — ZOLEDRONIC ACID 5 MG/100ML
5 INJECTION, SOLUTION INTRAVENOUS ONCE
OUTPATIENT
Start: 2025-04-10

## 2025-04-09 RX ORDER — DIPHENHYDRAMINE HYDROCHLORIDE 50 MG/ML
50 INJECTION, SOLUTION INTRAMUSCULAR; INTRAVENOUS AS NEEDED
OUTPATIENT
Start: 2025-04-10

## 2025-04-09 RX ORDER — FAMOTIDINE 10 MG/ML
20 INJECTION, SOLUTION INTRAVENOUS ONCE AS NEEDED
OUTPATIENT
Start: 2025-04-10

## 2025-04-09 RX ORDER — ACETAMINOPHEN 325 MG/1
650 TABLET ORAL ONCE
OUTPATIENT
Start: 2025-04-10

## 2025-04-09 RX ORDER — ZOLEDRONIC ACID 5 MG/100ML
5 INJECTION, SOLUTION INTRAVENOUS
Qty: 100 ML | Refills: 0 | OUTPATIENT
Start: 2025-04-09

## 2025-04-09 RX ORDER — EPINEPHRINE 0.3 MG/.3ML
0.3 INJECTION SUBCUTANEOUS EVERY 5 MIN PRN
OUTPATIENT
Start: 2025-04-10

## 2025-04-09 RX ORDER — ALBUTEROL SULFATE 0.83 MG/ML
3 SOLUTION RESPIRATORY (INHALATION) AS NEEDED
OUTPATIENT
Start: 2025-04-10

## 2025-04-09 NOTE — PROGRESS NOTES
Subjective   Patient ID: Claudia Patten is a 75 y.o. female who presents for Follow-up (Ear cleaning ).  HPI  Patient has history of cerumen impactions and bilateral sensorineural hearing loss.  She wears bilateral hearing aids with good benefit.  Today, she complains of dry flaky skin to the left outer ear.    Review of Systems  A comprehensive or 10 points review of the patient´s constitutional, neurological, HEENT, pulmonary, cardiovascular and genito-urinary systems showed only those mentioned in history of present illness.  Objective   Physical Exam  Constitutional: no fever, chills, weight loss or weight gain   General appearance: Appears well, well-nourished, well groomed. No acute distress.   Communication: Normal communication   Psychiatric: Oriented to person, place and time. Normal mood and affect.   Neurologic: Cranial nerves II-XII grossly intact and symmetric bilaterally.   Head and Face:   Head: Atraumatic with no masses, lesions or scarring.   Face: Normal symmetry, no paralysis, synkinesis or facial tic. No scars or deformities.     Eyes: Conjunctiva not edematous or erythematous   Ears: External inspection of ears with no deformity, scars or masses. Bilateral canals with cerumen impactions.     Neck: Normal appearing, symmetric, trachea midline.   Cardiovascular: Examination of peripheral vascular system shows no clubbing or cyanosis.   Respiratory: No respiratory distress increased work of breathing. Inspection of the chest with symmetric chest expansion and normal respiratory effort.   Skin: No rashes in the head or neck    Assessment/Plan     This patient presents for subsequent evaluation of acute acquired bilateral cerumen impaction and chronic bilateral sensorineural hearing loss.    Reassurance given that otologic exam is normal after cleaning with the exception of slightly inflamed flaking skin to left conchal bowl.  I recommended using an over-the-counter steroid cream to this area  daily x 5 days.  She may follow-up as needed.  All questions were answered to patient's satisfaction.    This note was created using speech recognition transcription software. Despite proofreading, several typographical errors might be present that might affect the meaning of the content. Please call with any questions.    Patient ID: Claudia Patten is a 75 y.o. female.    Ear cerumen removal    Date/Time: 4/9/2025 2:10 PM    Performed by: JOBY López  Authorized by: JOBY López    Consent:     Consent obtained:  Verbal    Consent given by:  Patient    Risks discussed:  Pain    Alternatives discussed:  No treatment  Procedure details:     Location:  L ear and R ear    Procedure type: curette      Procedure outcomes: cerumen removed    Post-procedure details:     Inspection:  No bleeding, ear canal clear and TM intact    Hearing quality:  Normal    Procedure completion:  Tolerated well, no immediate complications

## 2025-04-09 NOTE — TELEPHONE ENCOUNTER
Patient walked into the office a few minutes ago and stated no one ever reached out to her in regards to Reclast. CTX was done. Did you send over a Therapy Plan to  Specialty?    Thanks

## 2025-04-10 ENCOUNTER — DOCUMENTATION (OUTPATIENT)
Dept: INFUSION THERAPY | Facility: CLINIC | Age: 75
End: 2025-04-10
Payer: MEDICARE

## 2025-04-10 ENCOUNTER — SPECIALTY PHARMACY (OUTPATIENT)
Dept: PHARMACY | Facility: CLINIC | Age: 75
End: 2025-04-10

## 2025-04-10 DIAGNOSIS — M81.0 OSTEOPOROSIS, UNSPECIFIED OSTEOPOROSIS TYPE, UNSPECIFIED PATHOLOGICAL FRACTURE PRESENCE: ICD-10-CM

## 2025-04-10 NOTE — PROGRESS NOTES
CLINICAL CLEARANCE FOR OUTPATIENT INFUSION      Patient to be scheduled for  New Start of Reclast infusions    For Diagnosis: Osteoporosis     Labs required prior to treatment (ORDERED):  Lab Results   Component Value Date    CREATININE 0.58 04/25/2024      CrCl: 58.341 AT THAT TIME BUT WILL NEED UPDATED LABS PRIOR TO INFUSION    (hold / contact prescribing provider if <35ml/min)     Serum Calcium: 8.8     (Serum or Corrected Calcium must be >8.6mg/dl. OR Ionized Calcium must be >1.1 mmol/L or >4.7 mg/dL (depending on resulting agency).  If below WNL - Contact prescribing provider. Labs should be drawn within 28 days of first treatment.)    Lab Results   Component Value Date    CALCIUM 8.8 04/25/2024      Lab Results   Component Value Date    ALBUMIN 3.9 04/25/2024      Calcium and Vitamin D supplement noted on medication list? Yes    Current Outpatient Medications   Medication Instructions    ascorbic acid, vitamin C, 100 mg chewable tablet Chew.    biotin 1 mg tablet 1 tablet, Daily    calcium carbonate-vitamin D3 500 mg-10 mcg (400 unit) chewable tablet 2 times daily    cholecalciferol (Vitamin D-3) 25 MCG (1000 UT) tablet 1 tablet, Daily    cyanocobalamin (Vitamin B-12) 500 mcg tablet Take by mouth.    zoledronic acid (Reclast) 5 mg/100 mL piggyback 5 mg, intravenous, Yearly        Is the patient receiving or have an allergy to Zometa? No  Allergies   Allergen Reactions    Meperidine Unknown        No obvious recent dental work per chart review. Nurse to confirm no dental within past/next 4 weeks at encounter.    Urine Hcg test ordered prior to first infusion? Not applicable     Last infusion received: NA (if continuation)    Due: ANYTIME - PENDING LABS     Labs drawn no more than 28 days prior to their scheduled appointment    Okay to schedule for treatment as ordered by prescribing provider PENDING LABS     Marianne Ritter, APRN-CNP    [facility] ([facility]) [id number]  [facility] [address], [place] [address]  Phone: [phone]

## 2025-06-09 ENCOUNTER — LAB (OUTPATIENT)
Dept: LAB | Facility: HOSPITAL | Age: 75
End: 2025-06-09
Payer: MEDICARE

## 2025-06-09 LAB
ALBUMIN SERPL BCP-MCNC: 4 G/DL (ref 3.4–5)
ALP SERPL-CCNC: 58 U/L (ref 33–136)
ALT SERPL W P-5'-P-CCNC: 32 U/L (ref 7–45)
ANION GAP SERPL CALC-SCNC: 13 MMOL/L (ref 10–20)
AST SERPL W P-5'-P-CCNC: 38 U/L (ref 9–39)
BILIRUB SERPL-MCNC: 1.2 MG/DL (ref 0–1.2)
BUN SERPL-MCNC: 17 MG/DL (ref 6–23)
CALCIUM SERPL-MCNC: 9.5 MG/DL (ref 8.6–10.6)
CHLORIDE SERPL-SCNC: 102 MMOL/L (ref 98–107)
CO2 SERPL-SCNC: 31 MMOL/L (ref 21–32)
CREAT SERPL-MCNC: 0.51 MG/DL (ref 0.5–1.05)
EGFRCR SERPLBLD CKD-EPI 2021: >90 ML/MIN/1.73M*2
GLUCOSE SERPL-MCNC: 85 MG/DL (ref 74–99)
POTASSIUM SERPL-SCNC: 5.3 MMOL/L (ref 3.5–5.3)
PROT SERPL-MCNC: 6.4 G/DL (ref 6.4–8.2)
SODIUM SERPL-SCNC: 141 MMOL/L (ref 136–145)

## 2025-06-09 PROCEDURE — 80053 COMPREHEN METABOLIC PANEL: CPT

## 2025-06-10 LAB
25(OH)D3+25(OH)D2 SERPL-MCNC: 32 NG/ML (ref 30–100)
ALBUMIN SERPL-MCNC: 4 G/DL (ref 3.6–5.1)
ALP SERPL-CCNC: 53 U/L (ref 37–153)
ALT SERPL-CCNC: 29 U/L (ref 6–29)
ANION GAP SERPL CALCULATED.4IONS-SCNC: 7 MMOL/L (CALC) (ref 7–17)
AST SERPL-CCNC: 38 U/L (ref 10–35)
BILIRUB SERPL-MCNC: 1.1 MG/DL (ref 0.2–1.2)
BUN SERPL-MCNC: 17 MG/DL (ref 7–25)
CALCIUM SERPL-MCNC: 9.3 MG/DL (ref 8.6–10.4)
CHLORIDE SERPL-SCNC: 102 MMOL/L (ref 98–110)
CHOLEST SERPL-MCNC: 245 MG/DL
CHOLEST/HDLC SERPL: 2.2 (CALC)
CO2 SERPL-SCNC: 30 MMOL/L (ref 20–32)
CREAT SERPL-MCNC: 0.54 MG/DL (ref 0.6–1)
EGFRCR SERPLBLD CKD-EPI 2021: 95 ML/MIN/1.73M2
ERYTHROCYTE [DISTWIDTH] IN BLOOD BY AUTOMATED COUNT: 11.7 % (ref 11–15)
EST. AVERAGE GLUCOSE BLD GHB EST-MCNC: 120 MG/DL
EST. AVERAGE GLUCOSE BLD GHB EST-SCNC: 6.6 MMOL/L
GLUCOSE SERPL-MCNC: 82 MG/DL (ref 65–99)
HBA1C MFR BLD: 5.8 %
HCT VFR BLD AUTO: 40.8 % (ref 35–45)
HDLC SERPL-MCNC: 110 MG/DL
HGB BLD-MCNC: 13.6 G/DL (ref 11.7–15.5)
LDLC SERPL CALC-MCNC: 120 MG/DL (CALC)
MCH RBC QN AUTO: 33.2 PG (ref 27–33)
MCHC RBC AUTO-ENTMCNC: 33.3 G/DL (ref 32–36)
MCV RBC AUTO: 99.5 FL (ref 80–100)
NONHDLC SERPL-MCNC: 135 MG/DL (CALC)
PLATELET # BLD AUTO: 227 THOUSAND/UL (ref 140–400)
PMV BLD REES-ECKER: 10.8 FL (ref 7.5–12.5)
POTASSIUM SERPL-SCNC: 4.9 MMOL/L (ref 3.5–5.3)
PROT SERPL-MCNC: 6.2 G/DL (ref 6.1–8.1)
RBC # BLD AUTO: 4.1 MILLION/UL (ref 3.8–5.1)
SODIUM SERPL-SCNC: 139 MMOL/L (ref 135–146)
TRIGL SERPL-MCNC: 61 MG/DL
TSH SERPL-ACNC: 3.59 MIU/L (ref 0.4–4.5)
VIT B12 SERPL-MCNC: 367 PG/ML (ref 200–1100)
WBC # BLD AUTO: 3.7 THOUSAND/UL (ref 3.8–10.8)

## 2025-06-17 ENCOUNTER — APPOINTMENT (OUTPATIENT)
Dept: PRIMARY CARE | Facility: CLINIC | Age: 75
End: 2025-06-17
Payer: MEDICARE

## 2025-06-17 VITALS
HEART RATE: 78 BPM | HEIGHT: 63 IN | OXYGEN SATURATION: 97 % | BODY MASS INDEX: 17.19 KG/M2 | SYSTOLIC BLOOD PRESSURE: 152 MMHG | WEIGHT: 97 LBS | DIASTOLIC BLOOD PRESSURE: 75 MMHG

## 2025-06-17 DIAGNOSIS — Z12.31 ENCOUNTER FOR SCREENING MAMMOGRAM FOR BREAST CANCER: ICD-10-CM

## 2025-06-17 DIAGNOSIS — E78.2 MODERATE MIXED HYPERLIPIDEMIA NOT REQUIRING STATIN THERAPY: ICD-10-CM

## 2025-06-17 DIAGNOSIS — M81.0 AGE-RELATED OSTEOPOROSIS WITHOUT CURRENT PATHOLOGICAL FRACTURE: ICD-10-CM

## 2025-06-17 DIAGNOSIS — E55.9 VITAMIN D DEFICIENCY: ICD-10-CM

## 2025-06-17 DIAGNOSIS — D72.819 LEUKOPENIA, UNSPECIFIED TYPE: ICD-10-CM

## 2025-06-17 DIAGNOSIS — Z00.00 MEDICARE ANNUAL WELLNESS VISIT, SUBSEQUENT: Primary | ICD-10-CM

## 2025-06-17 DIAGNOSIS — R94.6 ABNORMAL THYROID EXAM: ICD-10-CM

## 2025-06-17 DIAGNOSIS — E53.8 VITAMIN B12 DEFICIENCY: ICD-10-CM

## 2025-06-17 DIAGNOSIS — E46 PROTEIN-CALORIE MALNUTRITION, UNSPECIFIED SEVERITY (MULTI): ICD-10-CM

## 2025-06-17 DIAGNOSIS — R73.9 ELEVATED BLOOD SUGAR: ICD-10-CM

## 2025-06-17 PROBLEM — H69.92 DYSFUNCTION OF LEFT EUSTACHIAN TUBE: Status: RESOLVED | Noted: 2023-10-11 | Resolved: 2025-06-17

## 2025-06-17 PROBLEM — Z78.0 ASYMPTOMATIC MENOPAUSAL STATE: Status: RESOLVED | Noted: 2023-05-12 | Resolved: 2025-06-17

## 2025-06-17 PROBLEM — K86.2 PANCREATIC CYST (HHS-HCC): Status: RESOLVED | Noted: 2023-05-12 | Resolved: 2025-06-17

## 2025-06-17 PROBLEM — H25.013 CORTICAL SENILE CATARACT OF BOTH EYES: Status: RESOLVED | Noted: 2023-10-11 | Resolved: 2025-06-17

## 2025-06-17 PROBLEM — L57.9 SKIN CHANGES DUE TO CHRONIC EXPOSURE TO NONIONIZING RADIATION, UNSPECIFIED: Status: RESOLVED | Noted: 2017-12-11 | Resolved: 2025-06-17

## 2025-06-17 PROBLEM — H61.23 IMPACTED CERUMEN OF BOTH EARS: Status: RESOLVED | Noted: 2023-10-11 | Resolved: 2025-06-17

## 2025-06-17 PROCEDURE — 1170F FXNL STATUS ASSESSED: CPT | Performed by: FAMILY MEDICINE

## 2025-06-17 PROCEDURE — 99213 OFFICE O/P EST LOW 20 MIN: CPT | Performed by: FAMILY MEDICINE

## 2025-06-17 PROCEDURE — G0439 PPPS, SUBSEQ VISIT: HCPCS | Performed by: FAMILY MEDICINE

## 2025-06-17 PROCEDURE — 1036F TOBACCO NON-USER: CPT | Performed by: FAMILY MEDICINE

## 2025-06-17 PROCEDURE — G2211 COMPLEX E/M VISIT ADD ON: HCPCS | Performed by: FAMILY MEDICINE

## 2025-06-17 PROCEDURE — 99397 PER PM REEVAL EST PAT 65+ YR: CPT | Performed by: FAMILY MEDICINE

## 2025-06-17 PROCEDURE — 1159F MED LIST DOCD IN RCRD: CPT | Performed by: FAMILY MEDICINE

## 2025-06-17 PROCEDURE — 1160F RVW MEDS BY RX/DR IN RCRD: CPT | Performed by: FAMILY MEDICINE

## 2025-06-17 ASSESSMENT — PROMIS GLOBAL HEALTH SCALE
RATE_MENTAL_HEALTH: VERY GOOD
CARRYOUT_PHYSICAL_ACTIVITIES: COMPLETELY
EMOTIONAL_PROBLEMS: OFTEN
RATE_GENERAL_HEALTH: GOOD
CARRYOUT_SOCIAL_ACTIVITIES: VERY GOOD
RATE_SOCIAL_SATISFACTION: GOOD
RATE_AVERAGE_FATIGUE: MILD
RATE_AVERAGE_PAIN: 0
RATE_PHYSICAL_HEALTH: GOOD
RATE_QUALITY_OF_LIFE: VERY GOOD

## 2025-06-17 ASSESSMENT — ACTIVITIES OF DAILY LIVING (ADL)
DRESSING: INDEPENDENT
MANAGING_FINANCES: INDEPENDENT
BATHING: INDEPENDENT
DOING_HOUSEWORK: INDEPENDENT
GROCERY_SHOPPING: INDEPENDENT
TAKING_MEDICATION: INDEPENDENT

## 2025-06-17 NOTE — PROGRESS NOTES
"Subjective   Patient ID: Claudia Patten is a 75 y.o. female who presents for Medicare Annual Wellness Visit Subsequent.      Patient Care Team:  Chika Nicolas MD as PCP - General (Hospitalist)  Chika Nicolas MD as PCP - [employer] PCP    HPI     The patient states she will start taking Reclast injections from January 2026 for osteoporosis and is taking calcium, vit D.   She feels like she is able to manage her anxiety well on her own.  She is following with Dr. Espinal in Rheumatology. She cut down on alcohol and has not been taking any.  She is living by herself.      She fell once on ice in the past year.  She mentions she lost pleasure in doing things in the last few weeks.  No depression. She eats a well-balanced diet, drinks enough fluids, decaf coffee, and is active, walks around the block.  Denies hearing issues. She has varicose veins and recommend to wear compression socks during the day.     Labs shows some leukopenia.  One of the liver function was off by some pints which came back normal on next lab. No concerns.  A1c went up to 5.8. , better than before. Thyroid and vitamins good.  Recommend to be conscientious of sugar intake.      Review of Systems  Constitutional: No fever or chills, No Night Sweats  Eyes: No Blurry Vision or Eye sight problems  ENT: No Nasal Discharge, Hoarseness, sore throat +little mucousy in the morning  Cardiovascular: no chest pain, no palpitations and no syncope.   Respiratory: no cough, no shortness of breath during exertion and no shortness of breath at rest.   Gastrointestinal: no abdominal pain, no nausea and no vomiting.   : No vaginal discharge, burning with urination, no blood in urine or stools   Skin: No Skin rashes or Lesions  Neuro: No Headache, no dizziness or Numbness or tingling  Psych: No Anxiety, depression or sleeping problems  Heme: No Easy bleeding or brusing.     Objective   /75   Pulse 78   Ht 1.6 m (5' 3\")   Wt (!) " 44 kg (97 lb)   SpO2 97%   BMI 17.18 kg/m²     Physical Exam  Constitutional: Alert and in no acute distress. Well developed, well nourished.   Head and Face: Head and face: Normal.    Eyes: Normal external exam. Pupils were equal in size, round, reactive to light (PERRL) with normal accommodation and extraocular movements intact (EOMI).   Ears, Nose, Mouth, and Throat: External inspection of ears and nose: Normal.  Hearing: Normal.  Nasal mucosa, septum, and turbinates: Normal.  Lips, teeth, and gums: Normal.  Oropharynx: Normal.   Neck: No neck mass was observed. Supple. Thyroid not enlarged and there were no palpable thyroid nodules.   Cardiovascular: Heart rate and rhythm were normal, normal S1 and S2. Pedal pulses: Normal. No peripheral edema.   Pulmonary: No respiratory distress. Clear bilateral breath sounds.   Abdomen: Soft nontender; no abdominal mass palpated. Normal bowel sounds. No organomegaly.   : Deferred   Musculoskeletal: No joint swelling seen, normal movements of all extremities. Range of motion: Normal.  Muscle strength/tone: Normal.    Skin: Normal skin color and pigmentation, normal skin turgor, and no rash.   Neurologic: Deep tendon reflexes were 2+ and symmetric.   Psychiatric: Judgment and insight: Intact. Mood and affect: Normal.  Lymphatic: No cervical lymphadenopathy. Palpation of lymph nodes in axillae: Normal.  Palpation of lymph nodes in groin: Normal.    Lab Results   Component Value Date    WBC 3.7 (L) 06/09/2025    HGB 13.6 06/09/2025    HCT 40.8 06/09/2025     06/09/2025    CHOL 245 (H) 06/09/2025    TRIG 61 06/09/2025     06/09/2025    ALT 32 06/09/2025    AST 38 06/09/2025     06/09/2025    K 5.3 06/09/2025     06/09/2025    CREATININE 0.51 06/09/2025    BUN 17 06/09/2025    CO2 31 06/09/2025    TSH 3.59 06/09/2025    HGBA1C 5.8 (H) 06/09/2025       XR sacrum coccyx 2+ views  Narrative: Interpreted By:  Bob Young,   STUDY:  XR SACRUM COCCYX 2+  VIEWS; ;  2/26/2025 10:09 am      INDICATION:  Signs/Symptoms:pain after fall.      ,W00.9XXA Unspecified fall due to ice and snow, initial  encounter,M53.3 Sacrococcygeal disorders, not elsewhere classified      COMPARISON:  None.      ACCESSION NUMBER(S):  [id number]      ORDERING CLINICIAN:  PRUDENCIO FRYE      FINDINGS:  Sacrum and coccyx, three views      There is no fracture. There is no dislocation. There are no  degenerative changes. There is no lytic or sclerotic lesion. There is  no soft tissue abnormality seen.      Impression: No definite fracture seen. If there is persistent concern CT can be  performed for further evaluation          MACRO:  None      Signed by: Bob Young 2/27/2025 7:35 PM  Dictation workstation:   [id number]  XR wrist left 3+ views  Narrative: Interpreted By:  Bob Young,   STUDY:  XR WRIST LEFT 3+ VIEWS; ;  2/26/2025 10:08 am      INDICATION:  Signs/Symptoms:left wrsit pain after fall.      ,W00.9XXA Unspecified fall due to ice and snow, initial  encounter,M25.532 Pain in left wrist      COMPARISON:  None.      ACCESSION NUMBER(S):  [id number]      ORDERING CLINICIAN:  PRUDENCIO FRYE      FINDINGS:  Left wrist, three views      Questionable dorsal ossific fragment suggestive of a triquetral  fracture. There is no fracture. There is no dislocation. There are no  degenerative changes. There is mild chondrocalcinosis in the TFCC  region. Erosions seen there is no lytic or sclerotic lesion. There is  soft tissue edema about the wrist especially dorsally      Impression: Questionable dorsal triquetral fracture. Soft tissue edema well.      MACRO:  None      Signed by: Bob Young 2/27/2025 7:29 PM  Dictation workstation:   [id number]      Assessment/Plan   Diagnoses and all orders for this visit:  Medicare annual wellness visit, subsequent  -     1 Year Follow Up In [facility] - PCP - Wellness Exam; Future  Age-related osteoporosis without current  pathological fracture  -     Follow Up In [facility] - PCP - Medicare Annual  BMI less than 19,adult  Encounter for screening mammogram for breast cancer  -     BI mammo bilateral screening tomosynthesis; Future  Protein-calorie malnutrition, unspecified severity (Multi)  Leukopenia, unspecified type  -     CBC; Future  Moderate mixed hyperlipidemia not requiring statin therapy  -     Comprehensive Metabolic Panel; Future  -     Lipid Panel; Future  Vitamin B12 deficiency  -     Vitamin B12; Future  Vitamin D deficiency  -     Vitamin D 25-Hydroxy,Total (for eval of Vitamin D levels); Future  Elevated blood sugar  -     Hemoglobin A1C; Future  Abnormal thyroid exam  -     TSH with reflex to Free T4 if abnormal; Future        Dear Claudia Patten     It was my pleasure to take care of you today in the office. Below are the things we discussed today:    1. 1. Immunizations: Yearly Flu shot is recommended. Up-to-date          a: COVID: Booster up-to-date         b: Tetanus: Up-to-date. Done in 2022          c: Shingrix: Up-to-date         e: Prevnar: Up-to-date         F. RSV:  Up-to-date as per patient.          2. Blood Work: Reviewed   3. Seen your dentist twice a year  4. Yearly Eye exam is recommended     5. BMI: Underweight  6: Diet recommendations:   Eat Clean, Try to have as many home cooked meals as possible  Avoid processed foods which contain excess calories, sugar, and sodium.     7. Exercise recommendations:   150 minutes a week to maintain your weight      If you have to lose weight, you need a better diet and exercise plan.      8. Supplements recommended:  a - Calcium 600 mg up to twice a day to get a total of 1200 mg. Each 8 oz of milk or yogurt or 1 oz of cheese, 1 Banana, 1 serving of green Leafy vegetable has about 300 mg of Calcium, so you may subtract that amount. Calcium citrate is the only acceptable supplement to take if you take an acid suppressing medication like Prilosec;  otherwise Calcium carbonate is acceptable too (It can cause Constipation).   b - Vitamin D - 2000 IU daily      9. Please get your Living will / Advance directive completed if you do not have one already. Please make sure our office has a copy of the latest one.      10. Colonoscopy: Uptodate. Last done in Nov 2024. No more repeats needed.  11. Mammogram: Uptodate. Ordered for July 2025  12. PAP: Not indicated   13. DEXA: Up-to-date. Follows with Dr. Espinal.   14: Skin Check: Please see Dermatology once a year for a Skin Check.     15. Lung nodule: Stable.       16. Hyperlipidemia: , better than prior.     17. Neutropenia: Stable.     18.  Osteoporosis:  Patient will start taking Reclast infusions for osteoporosis in Jan 2026. Following with Dr. Espinal.    19. Code status:  She is a DNR.       Follow up in one year for a Physical. Please call the office before your Physical to see if you need blood work completed prior to your physical.     Please call me if any questions arise from now until your next visit. I will call you after I am done seeing patients. A Doctor is always available by phone when the office is closed. Please feel free to call for help with any problem that you feel shouldn't wait until the office re-opens.     I, Chika Nicolas MD, attest that this note for 6/17/2025 accurately reflects documentation that my scribe Meenu Morse, made at my direction in my capacity as Chika Nicolas MD when I treated Claudia Patten.    Scribe Attestation  By signing my name below, IMeenu Scribe   attest that this documentation has been prepared under the direction and in the presence of Chika Nicolas MD.

## 2025-06-27 ENCOUNTER — APPOINTMENT (OUTPATIENT)
Dept: INFUSION THERAPY | Facility: CLINIC | Age: 75
End: 2025-06-27
Payer: MEDICARE

## 2025-07-09 ENCOUNTER — APPOINTMENT (OUTPATIENT)
Dept: OTOLARYNGOLOGY | Facility: CLINIC | Age: 75
End: 2025-07-09
Payer: MEDICARE

## 2025-07-09 DIAGNOSIS — H90.3 SENSORINEURAL HEARING LOSS (SNHL) OF BOTH EARS: ICD-10-CM

## 2025-07-09 DIAGNOSIS — H61.23 BILATERAL IMPACTED CERUMEN: Primary | ICD-10-CM

## 2025-07-09 PROCEDURE — 1159F MED LIST DOCD IN RCRD: CPT | Performed by: NURSE PRACTITIONER

## 2025-07-09 PROCEDURE — 69210 REMOVE IMPACTED EAR WAX UNI: CPT | Performed by: NURSE PRACTITIONER

## 2025-07-09 PROCEDURE — 1036F TOBACCO NON-USER: CPT | Performed by: NURSE PRACTITIONER

## 2025-07-09 PROCEDURE — 99213 OFFICE O/P EST LOW 20 MIN: CPT | Performed by: NURSE PRACTITIONER

## 2025-07-09 PROCEDURE — 1160F RVW MEDS BY RX/DR IN RCRD: CPT | Performed by: NURSE PRACTITIONER

## 2025-07-09 NOTE — PROGRESS NOTES
Subjective   Patient ID: Claudia Patten is a 75 y.o. female who presents for Follow-up.  HPI  Patient has history of cerumen impactions and bilateral sensorineural hearing loss.  She wears bilateral hearing aids with good benefit.  Today, she denies any otologic complaints.    Review of Systems  A comprehensive or 10 points review of the patient´s constitutional, neurological, HEENT, pulmonary, cardiovascular and genito-urinary systems showed only those mentioned in history of present illness.  Objective   Physical Exam  Constitutional: no fever, chills, weight loss or weight gain   General appearance: Appears well, well-nourished, well groomed. No acute distress.   Communication: Normal communication   Psychiatric: Oriented to person, place and time. Normal mood and affect.   Neurologic: Cranial nerves II-XII grossly intact and symmetric bilaterally.   Head and Face:   Head: Atraumatic with no masses, lesions or scarring.   Face: Normal symmetry, no paralysis, synkinesis or facial tic. No scars or deformities.     Eyes: Conjunctiva not edematous or erythematous   Ears: External inspection of ears with no deformity, scars or masses. Bilateral canals with cerumen impactions.     Neck: Normal appearing, symmetric, trachea midline.   Cardiovascular: Examination of peripheral vascular system shows no clubbing or cyanosis.   Respiratory: No respiratory distress increased work of breathing. Inspection of the chest with symmetric chest expansion and normal respiratory effort.   Skin: No rashes in the head or neck    Assessment/Plan     This patient presents for subsequent evaluation of acute acquired bilateral cerumen impaction and chronic bilateral sensorineural hearing loss.    Reassurance given that otologic exam is normal after cleaning. She may follow-up as needed.  All questions were answered to patient's satisfaction.    This note was created using speech recognition transcription software. Despite  proofreading, several typographical errors might be present that might affect the meaning of the content. Please call with any questions.    Patient ID: Claudia Patten is a 75 y.o. female.    Ear cerumen removal    Date/Time: 7/9/2025 2:21 PM    Performed by: JOBY López  Authorized by: JOBY López    Consent:     Consent obtained:  Verbal    Consent given by:  Patient    Risks discussed:  Pain    Alternatives discussed:  No treatment  Procedure details:     Location:  R ear and L ear    Procedure type: curette      Procedure outcomes: cerumen removed    Post-procedure details:     Inspection:  No bleeding, ear canal clear and TM intact    Hearing quality:  Improved    Procedure completion:  Tolerated well, no immediate complications

## 2025-07-15 ENCOUNTER — APPOINTMENT (OUTPATIENT)
Dept: INFUSION THERAPY | Facility: CLINIC | Age: 75
End: 2025-07-15
Payer: MEDICARE

## 2025-07-15 VITALS
DIASTOLIC BLOOD PRESSURE: 74 MMHG | OXYGEN SATURATION: 98 % | HEART RATE: 63 BPM | TEMPERATURE: 98.2 F | SYSTOLIC BLOOD PRESSURE: 145 MMHG | BODY MASS INDEX: 16.89 KG/M2 | WEIGHT: 95.35 LBS | RESPIRATION RATE: 16 BRPM

## 2025-07-15 DIAGNOSIS — M81.0 OSTEOPOROSIS, UNSPECIFIED OSTEOPOROSIS TYPE, UNSPECIFIED PATHOLOGICAL FRACTURE PRESENCE: ICD-10-CM

## 2025-07-15 PROCEDURE — 96374 THER/PROPH/DIAG INJ IV PUSH: CPT | Performed by: NURSE PRACTITIONER

## 2025-07-15 RX ORDER — EPINEPHRINE 0.3 MG/.3ML
0.3 INJECTION SUBCUTANEOUS EVERY 5 MIN PRN
OUTPATIENT
Start: 2025-07-15

## 2025-07-15 RX ORDER — ZOLEDRONIC ACID 5 MG/100ML
5 INJECTION, SOLUTION INTRAVENOUS ONCE
Status: CANCELLED | OUTPATIENT
Start: 2025-07-15

## 2025-07-15 RX ORDER — ZOLEDRONIC ACID 5 MG/100ML
5 INJECTION, SOLUTION INTRAVENOUS ONCE
Status: COMPLETED | OUTPATIENT
Start: 2025-07-15 | End: 2025-07-15

## 2025-07-15 RX ORDER — ACETAMINOPHEN 325 MG/1
650 TABLET ORAL ONCE
Status: CANCELLED | OUTPATIENT
Start: 2025-07-15

## 2025-07-15 RX ORDER — ACETAMINOPHEN 325 MG/1
650 TABLET ORAL ONCE
Status: COMPLETED | OUTPATIENT
Start: 2025-07-15 | End: 2025-07-15

## 2025-07-15 RX ORDER — ALBUTEROL SULFATE 0.83 MG/ML
3 SOLUTION RESPIRATORY (INHALATION) AS NEEDED
OUTPATIENT
Start: 2025-07-15

## 2025-07-15 RX ORDER — FAMOTIDINE 10 MG/ML
20 INJECTION, SOLUTION INTRAVENOUS ONCE AS NEEDED
OUTPATIENT
Start: 2025-07-15

## 2025-07-15 RX ORDER — DIPHENHYDRAMINE HYDROCHLORIDE 50 MG/ML
50 INJECTION, SOLUTION INTRAMUSCULAR; INTRAVENOUS AS NEEDED
OUTPATIENT
Start: 2025-07-15

## 2025-07-15 RX ADMIN — ACETAMINOPHEN 650 MG: 325 TABLET ORAL at 14:47

## 2025-07-15 RX ADMIN — ZOLEDRONIC ACID 5 MG: 5 INJECTION, SOLUTION INTRAVENOUS at 14:58

## 2025-07-15 ASSESSMENT — ENCOUNTER SYMPTOMS
DIZZINESS: 0
EYE PROBLEMS: 0
FEVER: 0
MYALGIAS: 0
APPETITE CHANGE: 0
COUGH: 0
DIARRHEA: 0
DEPRESSION: 0
FREQUENCY: 0
HEMATURIA: 0
LEG SWELLING: 0
VOMITING: 0
NERVOUS/ANXIOUS: 0
BRUISES/BLEEDS EASILY: 0
TROUBLE SWALLOWING: 0
NAUSEA: 0
LIGHT-HEADEDNESS: 0
HEADACHES: 0
VOICE CHANGE: 0
BLOOD IN STOOL: 0
DYSURIA: 0
ARTHRALGIAS: 0
FATIGUE: 0
UNEXPECTED WEIGHT CHANGE: 0
WOUND: 0
EXTREMITY WEAKNESS: 0
NUMBNESS: 0
ABDOMINAL PAIN: 0
SHORTNESS OF BREATH: 0
PALPITATIONS: 0
SORE THROAT: 0
CHILLS: 0
CONSTIPATION: 0
WHEEZING: 0

## 2025-07-15 NOTE — PATIENT INSTRUCTIONS
Today :We administered acetaminophen and zoledronic acid.     For:   1. Osteoporosis, unspecified osteoporosis type, unspecified pathological fracture presence         Your next appointment is due in:  1 YEAR        Please read the  Medication Guide that was given to you and reviewed during todays visit.     (Tell all doctors including dentists that you are taking this medication)     Go to the emergency room or call 911 if:  -You have signs of allergic reaction:   -Rash, hives, itching.   -Swollen, blistered, peeling skin.   -Swelling of face, lips, mouth, tongue or throat.   -Tightness of chest, trouble breathing, swallowing or talking     Call your doctor:  - If IV / injection site gets red, warm, swollen, itchy or leaks fluid or pus.     (Leave dressing on your IV site for at least 2 hours and keep area clean and dry  - If you get sick or have symptoms of infection or are not feeling well for any reason.    (Wash your hands often, stay away from people who are sick)  - If you have side effects from your medication that do not go away or are bothersome.     (Refer to the teaching your nurse gave you for side effects to call your doctor about)    - Common side effects may include:  stuffy nose, headache, feeling tired, muscle aches, upset stomach  - Before receiving any vaccines     - Call the [facility] at   If:  - You get sick, are on antibiotics, have had a recent vaccine, have surgery or dental work and your doctor wants your visit rescheduled.  - You need to cancel and reschedule your visit for any reason. Call at least 2 days before your visit if you need to cancel.   - Your insurance changes before your next visit.    (We will need to get approval from your new insurance. This can take up to two weeks.)     The [facility] is opened Monday thru Friday. We are closed on weekends and holidays.   Voice mail will take your call if the center is closed. If you leave a message  please allow 24 hours for a call back during weekdays. If you leave a message on a weekend/holiday, we will call you back the next business day.    A pharmacist is available Monday - Friday from 8:30AM to 3:30PM to help answer any questions you may have about your prescriptions(s). Please call pharmacy at:    [facility]: [phone]  [facility]: [phone]  [facility]: [phone]

## 2025-07-15 NOTE — PROGRESS NOTES
[facility]   Infusion Clinic Note   Date: July 15, 2025   Name: Claudia Patten  : 1950   MRN: [id number]         Reason for Visit: New Patient Visit and OP Infusion (PT HERE FOR FIRST DOSE OF ANNNUAL RECLAST 5 MG INFUSION)         Today: We administered acetaminophen and zoledronic acid.       Ordered By: Harish Espinal DO       For a Diagnosis of: Osteoporosis, unspecified osteoporosis type, unspecified pathological fracture presence       At today's visit patient accompanied by: Self      Today's Vitals:   Vitals:    07/15/25 1425 07/15/25 1514   BP: 150/81 145/74   Pulse: 83 63   Resp: 16 16   Temp: 36.8 °C (98.3 °F) 36.8 °C (98.2 °F)   SpO2: 97% 98%   Weight: (!) 43.2 kg (95 lb 5.6 oz)              Pre - Treatment Checklist:      - Previous reaction to current treatment: n/a- FIRST DOSE      (Assess patient for the concerns below. Document provider notification as appropriate).  - Active or recent infection with/without current antibiotic use: no  - Recent or planned invasive dental work: no  - Recent or planned surgeries: no  - Recently received or plans to receive vaccinations: no  - Has treatment related toxicities: no  - Any chance may be pregnant:  n/a      Pain: 0   - Is the pain different from normal: n/a   - Is prescribing Doctor aware:  n/a      Labs: Reviewed       Fall Risk Screening: Garcia Fall Risk  History of Falling, Immediate or Within 3 Months: No  Secondary Diagnosis: Yes  Ambulatory Aid: Walks without aid/bedrest/nurse assist  Intravenous Therapy/Heparin Lock: Yes  Gait/Transferring: Normal/bedrest/immobile  Mental Status: Oriented to own ability  Garcia Fall Risk Score: 35            Review Of Systems:  Review of Systems   Constitutional:  Negative for appetite change, chills, fatigue, fever and unexpected weight change.   HENT:   Positive for hearing loss. Negative for mouth sores, sore throat, tinnitus, trouble swallowing and voice change.         PT  ADMITS TO HEARING LOSS AT BASELINE   Eyes:  Negative for eye problems.        PT HAS DX OF DRY EYE AND WEARS GLASSES   Respiratory:  Negative for cough, shortness of breath and wheezing.    Cardiovascular:  Negative for chest pain, leg swelling and palpitations.   Gastrointestinal:  Negative for abdominal pain, blood in stool, constipation, diarrhea, nausea and vomiting.   Genitourinary:  Negative for dysuria, frequency and hematuria.    Musculoskeletal:  Negative for arthralgias and myalgias.   Skin:  Negative for itching, rash and wound.   Neurological:  Negative for dizziness, extremity weakness, headaches, light-headedness and numbness.   Hematological:  Does not bruise/bleed easily.   Psychiatric/Behavioral:  Negative for depression. The patient is not nervous/anxious.          Infusion Readiness:  - Assessment Concerns Related to Infusion: No  - Provider notified: n/a      New Patient Education:    NEW PATIENT MEDICATION EDUCATION PT PROVIDED WITH WRITTEN ([email or web address] PT EDUCATION SHEET) AND VERBAL EDUCATION REGARDING MEDICATION GIVEN. VERIFIED MEDICATION NAME WITH PATIENT AND DISCUSSED REASON FOR USE. BRIEFLY DISCUSSED HOW MEDICATION WORKS AND EDUCATED ON GOAL OF TREATMENT, FREQUENCY OF TREATMENT, ADVERSE RXN'S AND COMMON SIDE EFFECTS TO MONITOR FOR. INSTRUCTED PT TO ASSURE THAT ALL PROVIDERS INCLUDING DENTISTS ARE AWARE OF MEDICATION RECEIVED. DISCUSSED FLOW OF VISIT AND ORIENTED TO INFUSION CENTER. PT VERBALIZES UNDERSTANDING. CALL LIGHT PROVIDED AND PT AWARE TO ALERT STAFF OF ANY CONCERNS DURING TREATMENT.        Treatment Conditions & Drug Specific Questions:    Zoledronic Acid  (RECLAST)    (Unless otherwise specified on patient specific therapy plan):     TREATMENT CONDITIONS:  Unless otherwise specified on patient specific therapy plan HOLD and notify provider prior to proceeding with treatment if:   o Creatinine clearance LESS THAN 35 mL/Minute  o Corrected or Serum Calcium LESS THAN 8.6 mg/dL  OR  Ionized calcium less than 1.1 mmol/L or  less than 4.7 mg/dL (depending on resulting agency)  o Recent (within 4 weeks) or planned invasive dental procedure.  -           Positive Pregnancy     Lab Results   Component Value Date    CREATININE 0.51 06/09/2025      Lab Results   Component Value Date    CALCIUM 9.5 06/09/2025      Lab Results   Component Value Date    CAION 1.23 03/09/2022       CrCl: 63.646  Corrected calcium: 9.5  **LABS FROM 6/9- OK TO USE PER ABRAM PORTER-CNP    Patient meets treatment conditions? Yes    DRUG SPECIFIC QUESTIONS:  Is the patient taking a Calcium and Vitamin D supplement?  Yes  (Recommended)    Is the patient receiving Zometa or do they have an allergy to Zometa?  No    Is the patient aware of the adverse effects which may include bone fractures, hypocalcemia, influenza-like illness, musculoskeletal pain, ocular inflammation, and osteonecrosis of the jaw? Yes      REMINDERS:  PREGNANCY CATEGORY X DRUG. OBTAIN NEGATIVE PREGNANCY TEST PRIOR TO FIRST INFUSION FOR WOMEN OF CHILDBEARING ABILITY     Recommended Vitals/Observation:  Monitor vital signs before infusion, at the end of the infusion and as needed        Weight Based Drug Calculations:    WEIGHT BASED DRUGS: NOT APPLICABLE / FLAT DOSE       Post Treatment: Patient tolerated treatment without issue and was discharged in no apparent distress.      Note Authored / Patient Cared for By: Becca Armstrong RN

## 2025-07-25 ENCOUNTER — APPOINTMENT (OUTPATIENT)
Dept: RADIOLOGY | Facility: CLINIC | Age: 75
End: 2025-07-25
Payer: MEDICARE

## 2025-07-25 VITALS — BODY MASS INDEX: 17.55 KG/M2 | WEIGHT: 95.35 LBS | HEIGHT: 62 IN

## 2025-07-25 DIAGNOSIS — Z12.31 ENCOUNTER FOR SCREENING MAMMOGRAM FOR BREAST CANCER: ICD-10-CM

## 2025-07-25 PROCEDURE — 77067 SCR MAMMO BI INCL CAD: CPT

## 2025-08-18 ENCOUNTER — APPOINTMENT (OUTPATIENT)
Dept: PRIMARY CARE | Facility: CLINIC | Age: 75
End: 2025-08-18
Payer: MEDICARE

## 2025-08-18 VITALS
HEIGHT: 62 IN | WEIGHT: 96 LBS | BODY MASS INDEX: 17.66 KG/M2 | HEART RATE: 69 BPM | SYSTOLIC BLOOD PRESSURE: 149 MMHG | DIASTOLIC BLOOD PRESSURE: 81 MMHG | OXYGEN SATURATION: 96 %

## 2025-08-18 DIAGNOSIS — N90.7 LABIAL CYST: Primary | ICD-10-CM

## 2025-08-18 PROCEDURE — 99213 OFFICE O/P EST LOW 20 MIN: CPT | Performed by: FAMILY MEDICINE

## 2025-10-09 ENCOUNTER — APPOINTMENT (OUTPATIENT)
Dept: OTOLARYNGOLOGY | Facility: CLINIC | Age: 75
End: 2025-10-09
Payer: MEDICARE

## 2026-01-08 ENCOUNTER — APPOINTMENT (OUTPATIENT)
Dept: OPHTHALMOLOGY | Facility: CLINIC | Age: 76
End: 2026-01-08
Payer: MEDICARE

## 2026-07-15 ENCOUNTER — APPOINTMENT (OUTPATIENT)
Dept: INFUSION THERAPY | Facility: CLINIC | Age: 76
End: 2026-07-15
Payer: MEDICARE